# Patient Record
Sex: FEMALE | Race: WHITE | NOT HISPANIC OR LATINO | Employment: FULL TIME | URBAN - METROPOLITAN AREA
[De-identification: names, ages, dates, MRNs, and addresses within clinical notes are randomized per-mention and may not be internally consistent; named-entity substitution may affect disease eponyms.]

---

## 2017-05-24 ENCOUNTER — TELEPHONE (OUTPATIENT)
Dept: PEDIATRIC CARDIOLOGY | Facility: CLINIC | Age: 22
End: 2017-05-24

## 2017-05-24 DIAGNOSIS — Q21.11 OSTIUM SECUNDUM TYPE ATRIAL SEPTAL DEFECT: ICD-10-CM

## 2017-05-24 DIAGNOSIS — Q21.10 ASD (ATRIAL SEPTAL DEFECT): ICD-10-CM

## 2017-05-24 DIAGNOSIS — Z87.74 S/P PDA REPAIR: ICD-10-CM

## 2017-05-24 DIAGNOSIS — R01.1 CARDIAC MURMUR: ICD-10-CM

## 2017-05-24 DIAGNOSIS — Q21.0 VENTRICULAR SEPTAL DEFECT: ICD-10-CM

## 2017-05-24 DIAGNOSIS — I45.10 RIGHT BUNDLE BRANCH BLOCK: Primary | ICD-10-CM

## 2017-05-30 ENCOUNTER — CLINICAL SUPPORT (OUTPATIENT)
Dept: PEDIATRIC CARDIOLOGY | Facility: CLINIC | Age: 22
End: 2017-05-30
Payer: COMMERCIAL

## 2017-05-30 DIAGNOSIS — R01.1 CARDIAC MURMUR: ICD-10-CM

## 2017-05-30 DIAGNOSIS — Q21.0 VENTRICULAR SEPTAL DEFECT: ICD-10-CM

## 2017-05-30 DIAGNOSIS — Q21.10 ASD (ATRIAL SEPTAL DEFECT): ICD-10-CM

## 2017-05-30 DIAGNOSIS — Z87.74 S/P PDA REPAIR: ICD-10-CM

## 2017-05-30 DIAGNOSIS — I45.10 RIGHT BUNDLE BRANCH BLOCK: ICD-10-CM

## 2017-05-31 ENCOUNTER — DOCUMENTATION ONLY (OUTPATIENT)
Dept: PEDIATRIC CARDIOLOGY | Facility: CLINIC | Age: 22
End: 2017-05-31

## 2017-05-31 DIAGNOSIS — I07.1 TRICUSPID VALVE INSUFFICIENCY, UNSPECIFIED ETIOLOGY: Primary | ICD-10-CM

## 2017-05-31 DIAGNOSIS — R93.1 ABNORMAL FINDING ON ECHOCARDIOGRAM: ICD-10-CM

## 2017-05-31 NOTE — PROGRESS NOTES
"TDK reviewed echo from 5/30/2017: "Last echo in Aug 2013 and RVSP at that time was 27mmHg. Will ask at follow up about any sleep disturbance vs. Anxiety. To follow up on 6/27/2017 with limited echo same day to recheck RVSP".    TDK/AB    Called Soy and updated on above review. Will plan to repeat limited echo same day as visit. All questions answered.    AB  "

## 2017-06-27 ENCOUNTER — CLINICAL SUPPORT (OUTPATIENT)
Dept: PEDIATRIC CARDIOLOGY | Facility: CLINIC | Age: 22
End: 2017-06-27
Payer: COMMERCIAL

## 2017-06-27 ENCOUNTER — OFFICE VISIT (OUTPATIENT)
Dept: PEDIATRIC CARDIOLOGY | Facility: CLINIC | Age: 22
End: 2017-06-27
Payer: COMMERCIAL

## 2017-06-27 VITALS
DIASTOLIC BLOOD PRESSURE: 57 MMHG | HEART RATE: 50 BPM | OXYGEN SATURATION: 100 % | HEIGHT: 67 IN | BODY MASS INDEX: 17.11 KG/M2 | SYSTOLIC BLOOD PRESSURE: 102 MMHG | WEIGHT: 109 LBS | RESPIRATION RATE: 20 BRPM

## 2017-06-27 DIAGNOSIS — I45.10 RBBB: ICD-10-CM

## 2017-06-27 DIAGNOSIS — I95.9 HYPOTENSION, UNSPECIFIED HYPOTENSION TYPE: ICD-10-CM

## 2017-06-27 DIAGNOSIS — Z87.74 S/P VSD CLOSURE: Primary | ICD-10-CM

## 2017-06-27 DIAGNOSIS — R93.1 ECHOCARDIOGRAM ABNORMAL: ICD-10-CM

## 2017-06-27 DIAGNOSIS — R01.1 MURMUR: ICD-10-CM

## 2017-06-27 DIAGNOSIS — I07.1 TRICUSPID VALVE INSUFFICIENCY, UNSPECIFIED ETIOLOGY: ICD-10-CM

## 2017-06-27 DIAGNOSIS — R93.1 ABNORMAL FINDING ON ECHOCARDIOGRAM: ICD-10-CM

## 2017-06-27 DIAGNOSIS — Z87.74 S/P REPAIR OF PDA: ICD-10-CM

## 2017-06-27 DIAGNOSIS — R94.31 EKG, ABNORMAL: ICD-10-CM

## 2017-06-27 DIAGNOSIS — Z87.74 S/P PDA REPAIR: ICD-10-CM

## 2017-06-27 DIAGNOSIS — Z91.89 AT HIGH RISK FOR CARDIAC ARRHYTHMIA: ICD-10-CM

## 2017-06-27 DIAGNOSIS — Z87.74 STATUS POST PATCH CLOSURE OF ASD: ICD-10-CM

## 2017-06-27 PROCEDURE — 93000 ELECTROCARDIOGRAM COMPLETE: CPT | Mod: S$GLB,,, | Performed by: PEDIATRICS

## 2017-06-27 PROCEDURE — 99214 OFFICE O/P EST MOD 30 MIN: CPT | Mod: S$GLB,,, | Performed by: PHYSICIAN ASSISTANT

## 2017-06-27 NOTE — PATIENT INSTRUCTIONS
Stephen Yun MD  Pediatric Cardiology  [address] [place] [address]  [phone]    General Guidelines    Name: Soy Serna                   : 1995    Diagnosis:   1. S/P VSD closure    2. Status post suture closure ASD    3. S/P repair of PDA    4. RBBB    5. EKG, abnormal    6. Hypotension, unspecified hypotension type        PCP: Evaristo Gannon Jr, MD  PCP Phone Number: [phone]    · If you have an emergency or you think you have an emergency, go to the nearest emergency room!     · Breathing too fast, doesnt look right, consistently not eating well, your child needs to be checked. These are general indications that your child is not feeling well. This may be caused by anything, a stomach virus, an ear ache or heart disease, so please call Evaristo Gannon Jr, MD. If Evaristo Gannon Jr, MD thinks you need to be checked for your heart, they will let us know.     · If your child experiences a rapid or very slow heart rate and has the following symptoms, call Evaristo Gannon Jr, MD or go to the nearest emergency room.   · unexplained chest pain   · does not look right   · feels like they are going to pass out   · actually passes out for unexplained reasons   · weakness or fatigue   · shortness of breath  or breathing fast   · consistent poor feeding     · If your child experiences a rapid or very slow heart rate that lasts longer than 30 minutes call Evaristo Gannon Jr, MD or go to the nearest emergency room.     · If your child feels like they are going to pass out - have them sit down or lay down immediately. Raise the feet above the head (prop the feet on a chair or the wall) until the feeling passes. Slowly allow the child to sit, then stand. If the feeling returns, lay back down and start over.              It is very important that you notify Evaristo Gannon Jr, MD first. Evaristo Gannon Jr, MD or the ER  Physician can reach Dr. Stephen Yun at the office or through [facility] at [phone] as needed.      Call our office ([phone]) one week after for test results.

## 2017-06-27 NOTE — PROGRESS NOTES
[facility]  Soy Serna  1995    Soy Serna is a 22 y.o. female presenting for follow-up of VSD s/p patch closure, ASD s/p suture closure, PDA s/p double ligation (all 5/6/97), hypotension, complete RBBB.  Soy is here today with her {PEDS CARD, HERE TODAY WITH:21092}.    HPI  Soy Serna has been followed in clinic with a history of VSD s/p patch closure, ASD s/p suture closure, PDA s/p double ligation (all 5/6/97), hypotension, complete RBBB.     She was last seen in June 2016 and at that time she was doing well with no complaints. His exam that day revealed a grade 1/6 Systolic ejection murmur radiating to left lower sternal boarder that is loudest while supine.       Mom states Soy has been doing well since last visit. Mom states Soy has a lot of energy and does not get short of breath with activity. Mom states Soy is meeting *** milestones. she is tolerating table food without any issues. Soy take oz of Enfamil every hours without diaphoresis, fatigue, or cyanosis. Denies any recent illness, surgeries, or hospitalizations.    There {ACTIONS; ARE/NOT:50701} reports of {Symptoms; cardiac peds w/o none:46526693}. No other cardiovascular or medical concerns are reported.     Current Medications:   Previous Medications    No medications on file     Allergies: Review of patient's allergies indicates:No Known Allergies    Family History   Problem Relation Age of Onset    No Known Problems Mother     No Known Problems Father     No Known Problems Maternal Grandmother     No Known Problems Maternal Grandfather     No Known Problems Paternal Grandmother     Cardiomyopathy Neg Hx     Early death Neg Hx     Congenital heart disease Neg Hx      Past Medical History:   Diagnosis Date    ASD (atrial septal defect)     s/p suture closure    Complete right bundle branch block     Functional heart murmur     Hypotension     PDA (patent ductus arteriosus)      s/p double ligation    Tricuspid regurgitation     w/ increased RVSP     VSD (ventricular septal defect)     s/p patch closure     Social History     Social History    Marital status: Single     Spouse name: N/A    Number of children: N/A    Years of education: N/A     Social History Main Topics    Smoking status: Passive Smoke Exposure - Never Smoker    Smokeless tobacco: Not on file    Alcohol use Not on file    Drug use: Unknown    Sexual activity: Not on file     Other Topics Concern    Not on file     Social History Narrative    No narrative on file     Past Surgical History:   Procedure Laterality Date    CARDIAC SURGERY  1997    [facility]: VSD- s/p patch closure; ASD- s/p suture closure; PDA- s/p double ligation    DENTAL SURGERY         Review of Systems  GENERAL: No fever, chills, fatigability, malaise  or weight loss.  CHEST: Denies dyspnea on exertion, cyanosis, wheezing, cough, sputum production or shortness of breath.  CARDIOVASCULAR: Denies chest pain, palpitations, diaphoresis, shortness of breath, or reduced exercise tolerance.  ABDOMEN: Appetite fine. No weight loss. Denies diarrhea, abdominal pain, nausea or vomiting.  PERIPHERAL VASCULAR: No edema, varicosities, or cyanosis.  NEUROLOGIC: no dizziness, no history of syncope by report, no headache   MUSCULOSKELETAL: Denies any muscle weakness or cramping  PSYCHOLOGICAL/BAHAVIORAL: Denies any anxiety, stress, confusion  SKIN: Denies any rashes or color change  HEMATOLOGIC: Denies any abnormal bruising or bleeding, denies sickle cell trait or disease  ALLERGY/IMMUNOLOGIC: Denies any environmental allergies.       Objective:   There were no vitals taken for this visit.    Physical Exam  GENERAL: Awake, well-developed well-nourished, no apparent distress  HEENT: mucous membranes moist and pink, normocephalic, no cranial or carotid bruits, sclera anicteric  NECK: no lymphadenopathy  CHEST: Good air movement, clear to auscultation  "bilaterally  CARDIOVASCULAR: Quiet precordium, regular rate and rhythm, single S1, split S2, normal P2, No S3 or S4, no rubs or gallops. No clicks or rumbles. No cardiomegaly by palpation. /6 murmur noted at the  ABDOMEN: Soft, nontender nondistended, no hepatosplenomegaly, no aortic bruits  EXTREMITIES: Warm well perfused, 2+ radial/pedal/femoral, pulses, capillary refill 2 seconds, no clubbing, cyanosis, or edema  NEURO: Alert and oriented, cooperative with exam, face symmetric, moves all extremities well.    Tests:   Today's EKG interpretation by Dr. Yun reveals:   {[facility] EK}  (Final report in electronic medical record)    Echocardiogram:   Preliminary report from today's limited echo:    Pertinent Echocardiographic findings from the Echo dated 17 are:   S/P VSD Patch, ASD and PDA suture.  Technically difficult study due to very thin body habitus and lung interference.  There are 4 chambers with normally aligned great vessels.  Chamber sizes are qualitatively normal.  There is good LV function.  There are no shunts noted.  The right coronary artery and left coronary are patent by 2D.  No residual shunting noted.  Flattened IVS motion  Two jets of moderate tricuspid regurgitation noted.  TAPSE 1.4  MV E/A 2.7  LV Lat. E' 13.5 cm/s  RVSP 38 mm Hg**  Clinical Correlation Suggested  Follow Up Warranted  Review with chart**  (Full report in electronic medical record)      Assessment:  No diagnosis found.      Discussion/Plan:   Soy Serna is a 22 y.o. female       Follow up with the primary care provider for the following issues: Nothing identified.    Activity:{Blank single:27710::"No activity restrictions are indicated at this time. Activities may include endurance training, interscholastic athletic, competition and contact sports.","Moderate activity restrictions are recommended. Activities may include regular physical education classes, tennis and baseball.","Light exercise is recommended. " "Activities such as non-strenuous team games, recreational swimming, jogging, and cycling are suggested.","Moderate activity limitations are recommended. Activities include attending school but NO participation in physical education classes.","Extreme activity restrictions are recommended. Activities should include only homebound or wheelchair activities.","She can participate in normal age-appropriate activities. She should be allowed to set .his own pace and rest if fatigued."}    {Blank single:37088::"Selective","Complete","No"} endocarditis prophylaxis is recommended in this circumstance.     I spent over 30 minutes with the patient. Over 50% of the time was spent counseling the patient and family member    Dr. Yun reviewed history and physical exam. He then performed the physical exam. He discussed the findings with the patient's caregiver(s), and answered all questions. I have reviewed our general guidelines related to cardiac issues with the family. I instructed them in the event of an emergency to call 911 or go to the nearest emergency room. They know to contact the PCP if problems arise or if they are in doubt.    Medications:   No current outpatient prescriptions on file.     No current facility-administered medications for this visit.         Orders:   No orders of the defined types were placed in this encounter.        Follow-Up:     Return to clinic in *** with EKG or sooner if there are any concerns.       Sincerely,  Stephen Yun MD    Note Contributing Authors:  MD Dominique Randolph PA-C  06/27/2017    Attestation: Stephen Yun MD    I have reviewed the records and agree with the above. I have examined the patient and discussed the findings with the family in attendance. All questions were answered to their satisfaction. I agree with the plan and the follow up instructions.  "

## 2017-06-27 NOTE — LETTER
June 27, 2017      Evaristo Gannon Jr., MD  [address] [facility] [address]           [facility]  [address]  [facility] [address]  Phone: [phone]  Fax: [phone]          Patient: Soy Serna   MR Number: [id number]   YOB: 1995   Date of Visit: 6/27/2017       Dear Dr. Stephen Yun:    Thank you for referring Soy Serna to me for evaluation. Attached you will find relevant portions of my assessment and plan of care.    If you have questions, please do not hesitate to call me. I look forward to following Soy Serna along with you.    Sincerely,    Tamica Polo PA-C    Enclosure  CC:  No Recipients    If you would like to receive this communication electronically, please contact [email or web address] or [phone] to request more information on [facility] [employer] access.    For providers and/or their staff who would like to refer a patient to [facility], please contact us through our one-stop-shop provider referral line, [facility], at [phone].    If you feel you have received this communication in error or would no longer like to receive these types of communications, please e-mail [email or web address]

## 2017-06-27 NOTE — PROGRESS NOTES
"[facility]  Soy Serna  1995    Soy Serna is a 22 y.o. female presenting for follow-up of VSD s/p patch closure, ASD s/p suture closure, PDA s/p suture closure, PSA s/p double ligation, hypotension, complete RBBB.  Syo is here today unaccompanied but her father was on speaker phone to listen/talk to Dr. Yun.     HPI  Soy Serna is seen in clinic for follow up of VSD a/p patch closure, ASD s/p suture closure, PDA s/p suture closure, PSA s/p double ligation, hypotension, complete RBBB. Murmur was noted on first day of life and cardiac exam was obtained, revealing VSD, PDA, and ASD. Surgery was done on  5/5/97 at [place] by Dr. Cintron which included Patch closure of VSD, Suture closure of ASD, and Double ligation of PDA.  Soy was last seen 6/16/15 and there was a grade 1/6 Systolic ejection murmur radiating to left upper sternal boarder that is loudest while supine.  She was asked to return in 1 year with echo 1st available. She is late for follow up which she has been in the past as well. Echo done 5/30/17 that showed Flattened IVS motion, Two jets of moderate tricuspid regurgitation noted, TAPSE 1.4, MV E/A 2.7, LV Lat. E' 13.5 cm/s, and RVSP 38 mm Hg. TDK reviewed echo from 5/30/2017: "Last echo in Aug 2013 and RVSP at that time was 27mmHg. Will ask at follow up about any sleep disturbance vs. Anxiety. To follow up on 6/27/2017 with limited echo same day to recheck RVSP".  Soy has been doing well since last visit.  Soy has a lot of energy and does not get short of breath with activity.  Denies any recent illness, surgeries, or hospitalizations. No concerns today. No sleep disturbances. She is not snoring at this time. She is not anxious and does not thinks he was anxious at previous echo. 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  She is not taking any medications at this time per report.     There are no reports of chest pain, chest pain with exertion, cyanosis, exercise " intolerance, dyspnea, fatigue, palpitations, syncope and tachypnea. No other cardiovascular or medical concerns are reported.     Current Medications:   Previous Medications    No medications on file     Allergies: Review of patient's allergies indicates:  No Known Allergies    Family History   Problem Relation Age of Onset    No Known Problems Mother     No Known Problems Father     No Known Problems Maternal Grandmother     No Known Problems Maternal Grandfather     No Known Problems Paternal Grandmother     No Known Problems Paternal Grandfather     Cardiomyopathy Neg Hx     Early death Neg Hx     Congenital heart disease Neg Hx     Arrhythmia Neg Hx     Heart attacks under age 50 Neg Hx     Hypertension Neg Hx     Long QT syndrome Neg Hx     Pacemaker/defibrilator Neg Hx      Past Medical History:   Diagnosis Date    ASD (atrial septal defect)     s/p suture closure    Complete right bundle branch block     Functional heart murmur     Hypotension     PDA (patent ductus arteriosus)     s/p double ligation    Tricuspid regurgitation     w/ increased RVSP     VSD (ventricular septal defect)     s/p patch closure     Social History     Social History    Marital status: Single     Spouse name: N/A    Number of children: N/A    Years of education: N/A     Social History Main Topics    Smoking status: Passive Smoke Exposure - Never Smoker    Smokeless tobacco: None    Alcohol use None    Drug use: Unknown    Sexual activity: Not Asked     Other Topics Concern    None     Social History Narrative    She is at [employer] studying [employer].      Past Surgical History:   Procedure Laterality Date    CARDIAC SURGERY  1997    [facility]: VSD- s/p patch closure; ASD- s/p suture closure; PDA- s/p double ligation    DENTAL SURGERY         Past medical history, family history, surgical history, social history updated and reviewed today.     Review of Systems    GENERAL: No fever, chills, fatigability,  "malaise  or weight loss.  CHEST: Denies GARSIA, cyanosis, wheezing, cough, sputum production or SOB.  CARDIOVASCULAR: Denies chest pain, palpitations, diaphoresis, SOB, or reduced exercise tolerance.  Endocrine: Denies polyphagia, polydipsia, polyuria  Skin: Denies rashes or color change  HENT: Negative for congestion, headaches and sore throat.   ABDOMEN: Appetite fine. No weight loss. Denies diarrhea, abdominal pain, nausea or vomiting.  PERIPHERAL VASCULAR: No edema, varicosities, or cyanosis.  Musculoskeletal: Negative for muscle weakness and stiffness.  NEUROLOGIC: no dizziness, no history of syncope by report, no headache   Psychiatric/Behavioral: Negative for altered mental status. The patient is not nervous/anxious.   Allergic/Immunologic: Negative for environmental allergies.     Objective:   BP (!) 102/57 (BP Location: Right arm, Patient Position: Lying, BP Method: Automatic)   Pulse (!) 50   Resp 20   Ht 5' 7" (1.702 m)   Wt 49.4 kg (109 lb)   SpO2 100%   BMI 17.07 kg/m²     Physical Exam  GENERAL: Awake, well-developed well-nourished, no apparent distress  HEENT: mucous membranes moist and pink, normocephalic, no cranial or carotid bruits, sclera anicteric  NECK:  no lymphadenopathy  CHEST: Good air movement, clear to auscultation bilaterally  CARDIOVASCULAR: Quiet precordium, regular rate and rhythm, single S1, constantly split S2 consistent with RBBB with normal pulmonary component , normal P2, No S3 or S4, no rubs or gallops. No clicks or rumbles. No cardiomegaly by palpation. 1/6 pulmonary ejection murmur noted at the ULSB. HR 96 bpm standing (WNL)  ABDOMEN: Soft, nontender nondistended, no hepatosplenomegaly, no aortic bruits  EXTREMITIES: Warm well perfused, 2+ radial/pedal/femoral, pulses, capillary refill 2 seconds, no clubbing, cyanosis, or edema  NEURO: Alert and oriented, cooperative with exam, face symmetric, moves all extremities well.  Skin: pink, turgor WNL  Vitals reviewed     Tests: "   Today's EKG interpretation by Dr. Yun reveals:   NSR   rSr' V1 (CRBBB)  Abnormal inferior T wave (AVF)   (+) (V5-6) t-wave  Borderline low voltage (QRS)  (Final report in electronic medical record)    Preliminary report of limited echo today showed RVSP WNL with max 31. Soy will call in 1 week for results.     Echo 5/30/17  S/P VSD Patch, ASD and PDA suture.  Technically difficult study due to very thin body habitus and lung interference.  There are 4 chambers with normally aligned great vessels.  Chamber sizes are qualitatively normal.  There is good LV function.  There are no shunts noted.  The right coronary artery and left coronary are patent by 2D.  No residual shunting noted.  Flattened IVS motion  Two jets of moderate tricuspid regurgitation noted.  TAPSE 1.4  MV E/A 2.7  LV Lat. E' 13.5 cm/s  RVSP 38 mm Hg  Clinical Correlation Suggested  Follow Up Warranted  Review with chart  (Full report in electronic medical record)     Holter/Event:   Holter/Event results from 8/6/11 are:  Sinus rhythm with complete right bundle branch block and artifact is noted. Maximum heart rate was 159 ( ST, watching scary movie, poorly defined p wave), minium heart rate was 43 (SB) and average heart rate was 76 (WNL). There was no SVT, VT, VF or complete heart block noted. Rare PAC's were noted. Rare PVC's were noted.      Treadmill/Stress:   Treadmill stress test results dated 1/19/09 are:  75th percentile for age. Stress test was stopped when due to patient fatigue and end point. Maximum heart rate was 180 and maximum blood pressure was 165/84. No dysrhythmias or ischemia were noted. No chest pain was reported. Recovery was within normal limits. Baseline CRBBB is noted.     Assessment:  Patient Active Problem List   Diagnosis    S/P VSD closure    Status post suture closure ASD    S/P repair of PDA    RBBB    EKG, abnormal    Hypotension    At high risk for cardiac arrhythmia    Echocardiogram abnormal        Discussion/ Plan:   Dr. Yun reviewed history and physical exam. He then performed the physical exam. He discussed the findings with the patient's caregiver(s), and answered all questions    Dr. Yun and I have reviewed our general guidelines related to cardiac issues with the family.  I instructed them in the event of an emergency to call 911 or go to the nearest emergency room.  They know to contact the PCP if problems arise or if they are in doubt.    Soy is followed in clinic for VSD s/p patch closure, ASD s/p suture closure, PDA s/p suture closure, PSA s/p double ligation, hypotension, complete RBBB. Her last echo suggested that her RVSP was elevated. However, repeat echo today showed a normal RVSP on preliminary report. She will call in 1 week for final results. Dr. Yun states that pulmonary HTN related to her VSD that has been closed is unlikely because there is no residual shunt and was closed early in life. Dr. Yun discussed the possibility of idiopathic pulmonary HTN but is unlikely as well. No reports of fatigue, SOB, ect. Therefore, Dr. Yun doubts that her RVSP was truly elevated and would like to repeat her echo 1 month before her next visit in 1 year for monitoring. Dr. Yun discussed importance of not missing appointments and following up at appropriate intervals. Dr. Yun discussed that she is at risk of dysrhythmias because she had surgery on her heart. She is asymptomatic at this time but should alert us with any palpations, chest pain, tachycardia, syncope, ect and will consider a monitor at that time. Her EKG is abnormal but Dr. Yun believes this fits with her history. Dr. Yun would like to repeat the EKG at the next visit to monitor for changes.  Her BP is on the low side again today. She is asymptomatic but Orthostatic hypotension guidelines were reviewed and include no dark water swimming without a life vest, no clear water swimming without a life vest and/or strict   and/or adult supervision.  If syncope or presyncope is experienced, they are to resume a position of comfort, either sitting or laying down.  I also suggested they elevate their feet 6 inches above their head .  I have requested the patient increase the intake of clear fluids with electrolytes (tap water if feasible) which may help to raise the blood pressure and should help combat orthostatic hypotension.  Dr. Yun and I have discussed normal heart rate and rhythm, physiological tachycardia, and cardiac dysrhythmias. We have discussed red flags for dysrhythmias including sudden onset and sudden resolution, heart rates which wake the child up from sleep during the night, tachycardia associated with syncope or which lasts for a long time, and heart rates which are very high. If Soy Serna should have tachycardia(fast heart rate) lasting more than 20 min accompanied by symptoms (Chest pain, dizziness, shortness of breath), the parents or legal guardians should be notified. In the event that Soy has loss of consciousness or is unresponsive, you should call 911, initiate CPR and notify parents or legal guardian.    I spent over 35 minutes with the patient. Over 50% of the time was spent counseling the patient and family member VSD s/p patch closure, ASD s/p suture closure, PDA s/p suture closure, PSA s/p double ligation, hypotension, complete RBBB, elevated RVSP, ect    1. Activity:No activity restrictions are indicated at this time. Activities may include endurance training, interscholastic athletic, competition and contact sports.      2. No endocarditis prophylaxis is recommended in this circumstance.     3. Medications:   No current outpatient prescriptions on file.     No current facility-administered medications for this visit.         4. Orders placed this encounter  Orders Placed This Encounter   Procedures    EKG 12-lead    Echocardiogram pediatric         Follow-Up:     Return to clinic in 1 year  with echo 1 month echo pending her limited echo today or sooner if there are any concerns      Sincerely,  Stephen Yun MD    Note Contributing Authors:  MD Tamica Randolph PA-C  06/27/2017    Attestation: Stephen Yun MD    I have reviewed the records and agree with the above. I have examined the patient and discussed the findings with the family in attendance. All questions were answered to their satisfaction. I agree with the plan and the follow up instructions.

## 2018-05-15 ENCOUNTER — CLINICAL SUPPORT (OUTPATIENT)
Dept: PEDIATRIC CARDIOLOGY | Facility: CLINIC | Age: 23
End: 2018-05-15
Attending: PEDIATRICS
Payer: COMMERCIAL

## 2018-05-15 DIAGNOSIS — I45.10 RBBB: ICD-10-CM

## 2018-05-15 DIAGNOSIS — Z87.74 S/P REPAIR OF PDA: ICD-10-CM

## 2018-05-15 DIAGNOSIS — I95.9 HYPOTENSION, UNSPECIFIED HYPOTENSION TYPE: ICD-10-CM

## 2018-05-15 DIAGNOSIS — Z91.89 AT HIGH RISK FOR CARDIAC ARRHYTHMIA: ICD-10-CM

## 2018-05-15 DIAGNOSIS — Z87.74 S/P VSD CLOSURE: ICD-10-CM

## 2018-05-15 DIAGNOSIS — Z87.74 STATUS POST PATCH CLOSURE OF ASD: ICD-10-CM

## 2018-05-15 DIAGNOSIS — R94.31 EKG, ABNORMAL: ICD-10-CM

## 2018-05-24 ENCOUNTER — TELEPHONE (OUTPATIENT)
Dept: PEDIATRIC CARDIOLOGY | Facility: CLINIC | Age: 23
End: 2018-05-24

## 2018-05-24 NOTE — TELEPHONE ENCOUNTER
"Called Soy to update: "Please update Soy: Dr. Yun reviewed echo and stable findings from previous echo in 2017. Her RV function is mildly decreased (new) but since it is only mild, will continue to monitor. At her last visit she did not report snoring (sign of TAINA) or SOB,fatigue,ect (signs of pulmonary HTN) but needs to be monitored for changes. Will correlate findings at follow up visit in July."  Reminded of f/u on 7/26/2018. All questions answered.   "

## 2018-05-24 NOTE — TELEPHONE ENCOUNTER
Echo 5/15/18  S/P VSD, ASD, and PDA suture closures.  There are 4 chambers with normally aligned great vessels.  Chamber sizes are qualitatively normal.  There is good LV function.  The right coronary artery and left coronary are patent by 2D.  No residual shunts noted.  Moderate TR with 2 jets  Trivial MR, PI  Mildly decreased RV function  Bradycardia noted.  Flattening and paradoxical motion of the ventricular septum.  Trivial MR, PI  LV EF A4C 57%  RVSP 32, 35, 39 mmHg  TAPSE 12-14 mm  LV Lateral Tissue Doppler WNL  LA Volume 18 ml/m2  Clinical Correlation Suggested  Follow Up Warranted    Dr. Yun reviewed echo and stable findings from previous echo in May 2017.  Her RV function is mildly decreased (new) but since it is only mild, will continue to monitor. At her last visit she did not report snoring (sign of TAINA) or SOB,fatigue,ect (signs of pulmonary HTN) but needs to be monitored for changes. Will correlate findings at follow up visit in July.

## 2018-07-26 ENCOUNTER — OFFICE VISIT (OUTPATIENT)
Dept: PEDIATRIC CARDIOLOGY | Facility: CLINIC | Age: 23
End: 2018-07-26
Payer: COMMERCIAL

## 2018-07-26 ENCOUNTER — CLINICAL SUPPORT (OUTPATIENT)
Dept: PEDIATRIC CARDIOLOGY | Facility: CLINIC | Age: 23
End: 2018-07-26
Attending: NURSE PRACTITIONER
Payer: COMMERCIAL

## 2018-07-26 VITALS
WEIGHT: 107.19 LBS | DIASTOLIC BLOOD PRESSURE: 50 MMHG | OXYGEN SATURATION: 100 % | HEIGHT: 66 IN | BODY MASS INDEX: 17.23 KG/M2 | HEART RATE: 58 BPM | RESPIRATION RATE: 20 BRPM | SYSTOLIC BLOOD PRESSURE: 98 MMHG

## 2018-07-26 DIAGNOSIS — I45.10 RIGHT BUNDLE BRANCH BLOCK: ICD-10-CM

## 2018-07-26 DIAGNOSIS — Z91.89 AT HIGH RISK FOR CARDIAC ARRHYTHMIA: ICD-10-CM

## 2018-07-26 DIAGNOSIS — Z87.74 S/P VSD CLOSURE: ICD-10-CM

## 2018-07-26 DIAGNOSIS — Z87.74 STATUS POST PATCH CLOSURE OF ASD: ICD-10-CM

## 2018-07-26 DIAGNOSIS — Z87.74 S/P REPAIR OF PDA: ICD-10-CM

## 2018-07-26 PROCEDURE — 99214 OFFICE O/P EST MOD 30 MIN: CPT | Mod: S$GLB,,, | Performed by: NURSE PRACTITIONER

## 2018-07-26 PROCEDURE — 0298T HOLTER MONITOR - 3-14 DAY PEDIATRICS: CPT | Mod: S$GLB,,, | Performed by: PEDIATRICS

## 2018-07-26 PROCEDURE — 93000 ELECTROCARDIOGRAM COMPLETE: CPT | Mod: 59,S$GLB,, | Performed by: PEDIATRICS

## 2018-07-26 PROCEDURE — 3008F BODY MASS INDEX DOCD: CPT | Mod: CPTII,S$GLB,, | Performed by: NURSE PRACTITIONER

## 2018-07-26 RX ORDER — NORETHINDRONE ACETATE AND ETHINYL ESTRADIOL AND FERROUS FUMARATE 1MG-20(24)
KIT ORAL DAILY
COMMUNITY
Start: 2018-07-01 | End: 2022-11-22 | Stop reason: ALTCHOICE

## 2018-07-26 NOTE — PROGRESS NOTES
[facility]  Soy Serna  1995    Soy Serna is a 23 y.o. female presenting for follow-up of s/p VSD, PDA, ASD.  Soy is here unaccompanied today but father was on speaker phone throughout the visit.    CARMELO Sainz was diagnosed with CHD at birth, including VSD, PDA, and ASD. She was submitted for surgical repair at 2 months of age ([birth year], [facility] [place]) including patch closure of VSD, suture closure of ASD, double ligation of PDA and has done very well. Soy was last seen here in June 2017 and was doing well with no cardiac concerns voiced. Exam that day revealed grade 1/6 PEM noted at ULSB, HR 96bpm with standing, constantly split S2, CRBBB on EKG. She was asked to return in 1 year with echo just prior to return. She comes today as requested. Since the last visit, Soy has done well overall with no major illnesses or hospitalizations. She denies any concerns or symptoms.       Current Medications:   Previous Medications    NORETHINDRONE-E.ESTRADIOL-IRON 1 MG-20 MCG(24) /75 MG (4) CHEW    once daily.     Allergies: Review of patient's allergies indicates:  No Known Allergies    Family History   Problem Relation Age of Onset    No Known Problems Mother     No Known Problems Father     No Known Problems Maternal Grandmother     No Known Problems Maternal Grandfather     No Known Problems Paternal Grandmother     No Known Problems Paternal Grandfather     Cardiomyopathy Neg Hx     Early death Neg Hx     Congenital heart disease Neg Hx     Arrhythmia Neg Hx     Heart attacks under age 50 Neg Hx     Hypertension Neg Hx     Long QT syndrome Neg Hx     Pacemaker/defibrilator Neg Hx      Past Medical History:   Diagnosis Date    Abnormal EKG     ASD (atrial septal defect)     s/p suture closure    Complete right bundle branch block     Functional heart murmur     Hypotension     PDA (patent ductus arteriosus)     s/p double ligation    Tricuspid regurgitation      "w/ increased RVSP     VSD (ventricular septal defect)     s/p patch closure     Social History     Social History    Marital status: Single     Spouse name: N/A    Number of children: N/A    Years of education: N/A     Social History Main Topics    Smoking status: Passive Smoke Exposure - Never Smoker    Smokeless tobacco: None    Alcohol use None    Drug use: Unknown    Sexual activity: Not Asked     Other Topics Concern    None     Social History Narrative    She is at [employer] studying [employer]. No exercise or physical activity regularly.     Caffeine: mostly water but occasionally coke when out to eat.     Past Surgical History:   Procedure Laterality Date    CARDIAC SURGERY  1997    [facility]: VSD- s/p patch closure; ASD- s/p suture closure; PDA- s/p double ligation    DENTAL SURGERY       Birth History    Birth     Weight: 2.92 kg (6 lb 7 oz)    Gestation Age: 40 wks       Review of Systems   Constitutional: Negative for activity change, appetite change and fatigue.   Respiratory: Negative for shortness of breath, wheezing and stridor.         No snoring   Cardiovascular: Negative for chest pain and palpitations.   Gastrointestinal: Negative.    Genitourinary: Negative.    Musculoskeletal: Negative.    Skin: Negative for color change and rash.   Neurological: Negative for dizziness, seizures, syncope, weakness and headaches.   Hematological: Does not bruise/bleed easily.       Objective:   Vitals:    07/26/18 0948   BP: (!) 98/50   BP Location: Right arm   Patient Position: Lying   BP Method: Medium (Automatic)   Pulse: (!) 58   Resp: 20   SpO2: 100%   Weight: 48.6 kg (107 lb 3 oz)   Height: 5' 6.22" (1.682 m)       Physical Exam   Constitutional: She is oriented to person, place, and time. Vital signs are normal. She appears well-developed and well-nourished. She is active and cooperative. No distress.   HENT:   Head: Normocephalic.   Neck: Normal range of motion.   Cardiovascular: Normal rate, " regular rhythm and S1 normal.   No extrasystoles are present. Exam reveals no S3 and no S4.    Murmur [facility] 1[facility]6 [facility] heard.  Pulses:       Radial pulses are 2+ on the right side.        Femoral pulses are 2+ on the right side.  There are no clicks, rumbles, rubs, lifts, taps, or thrills noted. Constantly split S2.    Pulmonary/Chest: Effort normal and breath sounds normal. No respiratory distress. She exhibits no deformity.   There is a well healed sternotomy   Abdominal: Soft. Normal appearance and bowel sounds are normal. She exhibits no distension. There is no hepatosplenomegaly.   There are no abdominal bruits noted.   Musculoskeletal: Normal range of motion.   Neurological: She is alert and oriented to person, place, and time.   Skin: Skin is warm and dry. No rash noted. No cyanosis. Nails show no clubbing.   Psychiatric: She has a normal mood and affect. Her speech is normal and behavior is normal.   Nursing note and vitals reviewed.      Tests:   Today's EKG interpretation by Dr. Yun reveals: normal sinus rhythm with QRS axis +55 degrees in the frontal plane. There is no atrial enlargement noted. CRBBB noted; no change.   (Final report in electronic medical record)    Echocardiogram:   Pertinent Echocardiographic findings from the Echo dated 5/15/18 are:   Moderate TR with 2 jets  Mildly decreased RV function  Bradycardia noted  Flattening and paradoxical motion of the ventricular septum  LVEF A4C 57%  RVSP 32, 35, 39mmHg  No residual shunts noted.   (Full report in electronic medical record)      Assessment:  1. S/P VSD closure    2. Status post suture closure ASD    3. S/P repair of PDA    4. Right bundle branch block    5. At high risk for cardiac arrhythmia        Discussion:   Dr. Yun reviewed history and physical exam. He then performed the physical exam. He discussed the findings with the patient's caregiver(s), and answered all questions.    Soy's examination  today is stable and consistent with her history of cardiac surgery.  She is asymptomatic with her heart rate and blood pressure being on the low side.  We will obtain a 3 day Holter monitor due to her risk of dysrhythmias secondary to her cardiac surgery.  We will plan to repeat an echo next year, being sure to get FAC measurements for right ventricular function.    I have reviewed our general guidelines related to cardiac issues with the family.  I instructed them in the event of an emergency to call 911 or go to the nearest emergency room.  They know to contact the PCP if problems arise or if they are in doubt.      Plan:    1. Activity:No activity restrictions are indicated at this time. Activities may include endurance training, interscholastic athletic, competition and contact sports.    2. No endocarditis prophylaxis is recommended in this circumstance.     3. Medications:   Current Outpatient Prescriptions   Medication Sig    norethindrone-e.estradiol-iron 1 mg-20 mcg(24) /75 mg (4) Chew once daily.     No current facility-administered medications for this visit.      4. Orders placed this encounter  Orders Placed This Encounter   Procedures    Holter Monitor - 3-14 Day Pediatrics    EKG 12-lead pediatric    Echocardiogram pediatric     5. Follow up with the primary care provider for the following issues: Nothing identified.      Follow-Up:   Follow-up for 3 day holter today, echo in 11 mo, clinic f/u and EKG in 1 year.      Sincerely,    Stephen Yun MD    Note Contributing Authors:  MD Tess Randolph APRN, PNP-C

## 2018-07-26 NOTE — PATIENT INSTRUCTIONS
Stephen Yun MD  Pediatric Cardiology  [address] [place] [address]  [phone]    General Guidelines    Name: Soy Serna                   : 1995    Diagnosis:   1. S/P VSD closure    2. Status post suture closure ASD    3. S/P repair of PDA    4. Right bundle branch block    5. At high risk for cardiac arrhythmia        PCP: Evaristo Gannon Jr, MD  PCP Phone Number: [phone]    · If you have an emergency or you think you have an emergency, go to the nearest emergency room!     · Breathing too fast, doesnt look right, consistently not eating well, your child needs to be checked. These are general indications that your child is not feeling well. This may be caused by anything, a stomach virus, an ear ache or heart disease, so please call Evaristo Gannon Jr, MD. If Evaristo Gannon Jr, MD thinks you need to be checked for your heart, they will let us know.     · If your child experiences a rapid or very slow heart rate and has the following symptoms, call Evaristo Gannon Jr, MD or go to the nearest emergency room.   · unexplained chest pain   · does not look right   · feels like they are going to pass out   · actually passes out for unexplained reasons   · weakness or fatigue   · shortness of breath  or breathing fast   · consistent poor feeding     · If your child experiences a rapid or very slow heart rate that lasts longer than 30 minutes call Evaristo Gannon Jr, MD or go to the nearest emergency room.     · If your child feels like they are going to pass out - have them sit down or lay down immediately. Raise the feet above the head (prop the feet on a chair or the wall) until the feeling passes. Slowly allow the child to sit, then stand. If the feeling returns, lay back down and start over.     It is very important that you notify Evaristo Gannon Jr, MD first. Evaristo Gannon Jr, MD or the ER Physician can  reach Dr. Stephen Yun at the office or through [facility] at [phone] as needed.    Call our office ([phone]) one week after ALL tests for results.

## 2018-07-26 NOTE — LETTER
July 26, 2018      Evaristo Gannon Jr., MD  [address]  [place] [place] [address]           [facility]  [address]  [place] [place] [address]  Phone: [phone]  Fax: [phone]          Patient: Soy Serna   MR Number: [id number]   YOB: 1995   Date of Visit: 7/26/2018       Dear Dr. Evaristo Gannon Jr.:    Thank you for referring Soy Serna to me for evaluation. Attached you will find relevant portions of my assessment and plan of care.    If you have questions, please do not hesitate to call me. I look forward to following Soy Serna along with you.    Sincerely,    MARY Zaidi,PNP-C    Enclosure  CC:  No Recipients    If you would like to receive this communication electronically, please contact [email or web address] or [phone] to request more information on [employer] access.    For providers and/or their staff who would like to refer a patient to [facility], please contact us through our one-stop-shop provider referral line, [facility], at [phone].    If you feel you have received this communication in error or would no longer like to receive these types of communications, please e-mail [email or web address]

## 2018-08-30 ENCOUNTER — TELEPHONE (OUTPATIENT)
Dept: PEDIATRIC CARDIOLOGY | Facility: CLINIC | Age: 23
End: 2018-08-30

## 2018-08-30 NOTE — TELEPHONE ENCOUNTER
Cielo phoned. Dr. Yun spoke with cielo and advised holter showed occasional bradycardia with junctional escape beats. Dr. Yun advised cielo Olivier needs stress test for further evaluation. Cielo advised he would get with Soy and call back and schedule.

## 2018-08-30 NOTE — TELEPHONE ENCOUNTER
Called father to discuss holter results - occasional bradycardia with junctional escape beats. She'll need a stress test. LM for father to return call.

## 2018-09-19 ENCOUNTER — CLINICAL SUPPORT (OUTPATIENT)
Dept: PEDIATRIC CARDIOLOGY | Facility: CLINIC | Age: 23
End: 2018-09-19
Payer: COMMERCIAL

## 2018-09-19 DIAGNOSIS — R94.31 ABNORMAL HOLTER MONITOR FINDING: ICD-10-CM

## 2018-09-19 DIAGNOSIS — Z87.74 S/P VSD CLOSURE: ICD-10-CM

## 2018-09-19 DIAGNOSIS — Z87.74 S/P VSD CLOSURE: Primary | ICD-10-CM

## 2022-01-31 ENCOUNTER — TELEPHONE (OUTPATIENT)
Dept: GENETICS | Facility: CLINIC | Age: 27
End: 2022-01-31
Payer: COMMERCIAL

## 2022-01-31 NOTE — TELEPHONE ENCOUNTER
----- Message from Tess Watson MS sent at 1/31/2022  3:02 PM CST -----  Contact: [phone]  Hey it's GC only.     ----- Message -----  From: Marian Hernandez MA  Sent: 1/31/2022   2:55 PM CST  To: Tess Watson MS    Does this pt need to be seen by GC or provider? Please advise  ----- Message -----  From: Sabina Mae  Sent: 1/31/2022   2:46 PM CST  To: [facility] Genetics Clinical Support Staff    Pt is calling to get an appt for genetic testing, her family has a history of leigh ann's disease.    Pt would like a call back.    [phone]        Pt would also like to know if she would need a referral from her PCP.

## 2022-01-31 NOTE — TELEPHONE ENCOUNTER
Spoke with pt in reference to scheduling a Genetics appointment for family history of leigh ann's disease on 2/14/22 at 9 am with Tess Watson. Pt verbalized understanding.

## 2022-02-01 ENCOUNTER — TELEPHONE (OUTPATIENT)
Dept: NEUROLOGY | Facility: CLINIC | Age: 27
End: 2022-02-01
Payer: COMMERCIAL

## 2022-02-01 ENCOUNTER — TELEPHONE (OUTPATIENT)
Dept: GENETICS | Facility: CLINIC | Age: 27
End: 2022-02-01
Payer: COMMERCIAL

## 2022-02-01 NOTE — TELEPHONE ENCOUNTER
----- Message from Makayla Mckay sent at 2/1/2022  3:50 PM CST -----  Regarding: appt  Contact: Izzy (mother) @ [phone]  Caller (Izzy [place]) patient of Dr. Delgado, asking to have patient scheduled an appt with Dr. Delgado. Caller says patient needs to be tested for [place]'s disease. Please call.

## 2022-02-01 NOTE — TELEPHONE ENCOUNTER
Spoke with pt in reference to her having questions if she was also seeing a MD. I informed her that she was seeing the GC and if GC needed to consult with the MD, she will talk with them at the time of visit. Pt verbalized understanding.

## 2022-02-03 ENCOUNTER — TELEPHONE (OUTPATIENT)
Dept: NEUROLOGY | Facility: CLINIC | Age: 27
End: 2022-02-03
Payer: COMMERCIAL

## 2022-02-03 NOTE — TELEPHONE ENCOUNTER
SW spoke to pt's mother (who made the initial request for HD testing), who reported that she wants Dr. Delgado to be at genetic testing appt. SW explained that the HD clinic has a particular clinic/day/protocol around testing for HD and suggested that she sched with the HD at-risk clinic instead. Mother asked that I speak to pt about this.

## 2022-02-11 ENCOUNTER — TELEPHONE (OUTPATIENT)
Dept: GENETICS | Facility: CLINIC | Age: 27
End: 2022-02-11
Payer: COMMERCIAL

## 2022-02-14 ENCOUNTER — OFFICE VISIT (OUTPATIENT)
Dept: GENETICS | Facility: CLINIC | Age: 27
End: 2022-02-14
Payer: COMMERCIAL

## 2022-02-14 ENCOUNTER — LAB VISIT (OUTPATIENT)
Dept: LAB | Facility: HOSPITAL | Age: 27
End: 2022-02-14
Attending: MEDICAL GENETICS
Payer: COMMERCIAL

## 2022-02-14 VITALS — BODY MASS INDEX: 18.71 KG/M2 | HEART RATE: 89 BPM | HEIGHT: 67 IN | WEIGHT: 119.19 LBS | RESPIRATION RATE: 14 BRPM

## 2022-02-14 DIAGNOSIS — Z82.0 FAMILY HISTORY OF HUNTINGTON'S DISEASE: ICD-10-CM

## 2022-02-14 DIAGNOSIS — Z82.0 FAMILY HISTORY OF HUNTINGTON'S DISEASE: Primary | ICD-10-CM

## 2022-02-14 PROCEDURE — 99499 NO LOS: ICD-10-PCS | Mod: S$GLB,,, | Performed by: MEDICAL GENETICS

## 2022-02-14 PROCEDURE — 99999 PR PBB SHADOW E&M-EST. PATIENT-LVL III: CPT | Mod: PBBFAC,,,

## 2022-02-14 PROCEDURE — 99999 PR PBB SHADOW E&M-EST. PATIENT-LVL III: ICD-10-PCS | Mod: PBBFAC,,,

## 2022-02-14 PROCEDURE — 36415 COLL VENOUS BLD VENIPUNCTURE: CPT | Performed by: MEDICAL GENETICS

## 2022-02-14 PROCEDURE — 81271 HTT GENE DETC ABNOR ALLELES: CPT | Performed by: MEDICAL GENETICS

## 2022-02-14 PROCEDURE — 96040 PR GENETIC COUNSELING, EACH 30 MIN: ICD-10-PCS | Mod: S$GLB,,, | Performed by: MEDICAL GENETICS

## 2022-02-14 PROCEDURE — 96040 PR GENETIC COUNSELING, EACH 30 MIN: CPT | Mod: S$GLB,,, | Performed by: MEDICAL GENETICS

## 2022-02-14 PROCEDURE — 99499 UNLISTED E&M SERVICE: CPT | Mod: S$GLB,,, | Performed by: MEDICAL GENETICS

## 2022-02-14 NOTE — PROGRESS NOTES
Soy Serna   DOS: 2022  : 1995   MRN: [id number]    REFERRING MD: Self-referral     REASON FOR CONSULT: Our Medical Genetic Service was asked to provide genetic counseling for this 26-year-old female with a family history of Brennon disease (HD). She presents with her  for todays visit.     PRESENT ILLNESS: Ms. Serna is a 26-year-old female with no symptoms of HD but a family history of HD in her mother, maternal aunt, and maternal grandfather. The mother is a patient of Dr. Carpenter in the HD Clinic and has 45 CAG repeats.     PAST MEDICAL HISTORY:   S/P VSD closure  Status post suture closure ASD  S/P repair of PDA  Right bundle branch block  EKG, abnormal  Hypotension  At high risk for cardiac arrhythmia  Echocardiogram abnormal    FAMILY HISTORY: Ms. Serna does not have any children. She does not have any siblings. Her mother is 50 and started to display symptoms of HD about 4 years ago. She has 45 CAG repeats. Her maternal aunt had symptoms in her 30s. Her maternal grandfather started to have symptoms in his late 30s. She has another aunt who is 52 and is asymptomatic. She has not had genetic testing.         IMPRESSION: Ms. Serna is a 26-year-old female with a family history of Brennon disease (HD).     We discussed the option of HD genetic testing. HD is a neurodegenerative autosomal dominant disorder that causes progressive motor, cognitive, and psychiatric symptoms. It is caused by >36 CAG trinucleotide repeats in the HTT gene that encodes for the huntingtin protein. The size of the expansion can correlate with age of onset. CAG repeats that are 26 or less are within normal range. CAG repeats within 27-35 are within intermediate range. Individuals with CAG repeats between 36 and 39 are likely to develop HD but may have a later age of onset. Individuals with 40 or more repeats will develop HD. Juvenile onset is associated with repeats greater than 60. The average age of  onset for HD is between ages 35 to 44 years of age with median survival time being 15 to 18 years after onset.     Anticipation is a phenomenon that can occur in families with HD, in which severity increases and age of onset decreases in subsequent generations. This is more common when the pathogenic allele is inherited paternally.     We discussed Ms. Lees motivations for testing and how testing may change her life course. She has been thinking about genetic testing for HD for many years and has familial support. Her primary motivation for testing is for preconception and family planning purposes. We discussed that preimplantation genetic diagnosis is possible even without revealing her status. She is aware of this. Reproductive options would include preimplantation genetic diagnosis, egg donation, adoption, or testing a pregnancy after conception. We do not typically test minors for HD.     I provided genetic counseling around the HD testing process, provided psychosocial support and assessment, and discussed the possible implications involved in testing, including benefits and limitations of the Genetic Information Nondiscrimination Act (ALEXANDRO). Ms. Serna understood and seemed confident in her decision to go forward with testing. We made a follow-up for March 21st for in-person results disclosure because they will be in the area at that time.     RECOMMENDATIONS:  1. HD testing today (to [facility] with 30-day release)  2. Follow-up for in-person results disclosure scheduled for 3/21  3. Follow-up with HD clinic if positive     REFERENCES: Karen PUENTES, Christiano VIEIRA, Jackson MR. Brennon Disease. 1996 Oct [birth year] [Updated 2020 Jun 11]. In: Rad RUIZ, Hardeep HH, Zulma RA, et al., editors. [employer]® [Internet]. [place]): [facility], [place]; 6648-8775. Available from: https://www.ncbi.nlm.nih.gov/books/CLY0887/    TIME SPENT: 30 minutes     Tess Watson, MPH, MS, [facility]  Certified Genetic  Counselor  [facility]     Robby Means M.D.                                                                            Section Head - Medical Genetics                                                                                       [facility]

## 2022-02-23 LAB
CLINICAL BIOCHEMIST REVIEW: NORMAL
HD REASON FOR REFERRAL: NORMAL
HD RELEASED BY: NORMAL
HD RESULT SUMMARY: NEGATIVE
HD RESULT: NORMAL
HD SPECIMEN: NORMAL
SPECIMEN SOURCE: NORMAL

## 2022-03-18 ENCOUNTER — TELEPHONE (OUTPATIENT)
Dept: GENETICS | Facility: CLINIC | Age: 27
End: 2022-03-18
Payer: COMMERCIAL

## 2022-03-21 ENCOUNTER — OFFICE VISIT (OUTPATIENT)
Dept: GENETICS | Facility: CLINIC | Age: 27
End: 2022-03-21
Payer: COMMERCIAL

## 2022-03-21 VITALS — HEIGHT: 67 IN | WEIGHT: 117.94 LBS | RESPIRATION RATE: 14 BRPM | HEART RATE: 86 BPM | BODY MASS INDEX: 18.51 KG/M2

## 2022-03-21 DIAGNOSIS — Z82.0 FAMILY HISTORY OF HUNTINGTON'S DISEASE: Primary | ICD-10-CM

## 2022-03-21 PROCEDURE — 99499 NO LOS: ICD-10-PCS | Mod: S$GLB,,, | Performed by: MEDICAL GENETICS

## 2022-03-21 PROCEDURE — 99999 PR PBB SHADOW E&M-EST. PATIENT-LVL III: CPT | Mod: PBBFAC,,,

## 2022-03-21 PROCEDURE — 99999 PR PBB SHADOW E&M-EST. PATIENT-LVL III: ICD-10-PCS | Mod: PBBFAC,,,

## 2022-03-21 PROCEDURE — 99499 UNLISTED E&M SERVICE: CPT | Mod: S$GLB,,, | Performed by: MEDICAL GENETICS

## 2022-03-21 NOTE — PROGRESS NOTES
Office Visit - Genetic Counseling Evaluation   Soy Serna  : 1995  MRN: [id number]    DATE OF SERVICE: 3/21/22  TIME SPENT: 6min    REFERRING PROVIDER: No ref. provider found    REASON FOR CONSULT:  Mrs. Serna and her  return to clinic today for [place] disease results disclosure.    HISTORY OF PRESENTING ILLNESS: Mrs. Serna was seen by my colleague, Tess Watson, for pre-test counseling regarding [place] disease (HD). She is asymptomatic. Her family history is significant for HD in her symptomatic mother with a 45 CAG repeat allele.       MEDICAL HISTORY:    Patient Active Problem List   Diagnosis    S/P VSD closure    Status post suture closure ASD    S/P repair of PDA    Right bundle branch block    EKG, abnormal    Hypotension    At high risk for cardiac arrhythmia    Echocardiogram abnormal       DISCUSSION & IMPRESSION:  Mrs. Daphne Olivier is an asymptomatic 27 y.o. female with a family history of [place] disease (HD).      Mrs. Serna does not have [place] disease. HD is caused by a CAG trinucleotide repeat in the HTT gene. CAG repeats that are 26 or less are within the normal range. When the repeat is 40 or over, that individual will develop HD. Both of Mrs. Serna's HTT CAG repeats are in the normal range: 15 and 20 CAG repeats. We reviewed that since she tested negative, her future children are not at risk for HD from her side.      Results:         RECOMMENDATIONS:  1. Follow-up jose Bailey MS, [facility]  Licensed, Certified Genetic Counselor  [facility]    Babs Huerta MD  Medical Geneticist   [facility]

## 2022-08-18 DIAGNOSIS — I45.10 COMPLETE RIGHT BUNDLE BRANCH BLOCK (RBBB): Primary | ICD-10-CM

## 2022-08-24 ENCOUNTER — CLINICAL SUPPORT (OUTPATIENT)
Dept: PEDIATRIC CARDIOLOGY | Facility: CLINIC | Age: 27
End: 2022-08-24
Attending: NURSE PRACTITIONER
Payer: COMMERCIAL

## 2022-08-24 ENCOUNTER — OFFICE VISIT (OUTPATIENT)
Dept: PEDIATRIC CARDIOLOGY | Facility: CLINIC | Age: 27
End: 2022-08-24
Payer: COMMERCIAL

## 2022-08-24 VITALS
OXYGEN SATURATION: 65 % | HEART RATE: 65 BPM | SYSTOLIC BLOOD PRESSURE: 122 MMHG | DIASTOLIC BLOOD PRESSURE: 60 MMHG | RESPIRATION RATE: 20 BRPM | BODY MASS INDEX: 19.21 KG/M2 | WEIGHT: 122.38 LBS | HEIGHT: 67 IN

## 2022-08-24 DIAGNOSIS — Z87.74 STATUS POST PATCH CLOSURE OF ASD: ICD-10-CM

## 2022-08-24 DIAGNOSIS — Z87.74 S/P REPAIR OF PDA: ICD-10-CM

## 2022-08-24 DIAGNOSIS — Z87.74 S/P VSD CLOSURE: ICD-10-CM

## 2022-08-24 DIAGNOSIS — Z3A.12 12 WEEKS GESTATION OF PREGNANCY: ICD-10-CM

## 2022-08-24 DIAGNOSIS — Z91.89 AT HIGH RISK FOR CARDIAC ARRHYTHMIA: ICD-10-CM

## 2022-08-24 DIAGNOSIS — I45.10 COMPLETE RIGHT BUNDLE BRANCH BLOCK (RBBB): ICD-10-CM

## 2022-08-24 PROCEDURE — 1160F PR REVIEW ALL MEDS BY PRESCRIBER/CLIN PHARMACIST DOCUMENTED: ICD-10-PCS | Mod: CPTII,S$GLB,, | Performed by: NURSE PRACTITIONER

## 2022-08-24 PROCEDURE — 93244 CV 3-14 DAY PEDIATRIC HOLTER MONITOR (CUPID ONLY): ICD-10-PCS | Mod: ,,, | Performed by: PEDIATRICS

## 2022-08-24 PROCEDURE — 93242 EXT ECG>48HR<7D RECORDING: CPT | Mod: ,,, | Performed by: PEDIATRICS

## 2022-08-24 PROCEDURE — 99204 PR OFFICE/OUTPT VISIT, NEW, LEVL IV, 45-59 MIN: ICD-10-PCS | Mod: 25,S$GLB,, | Performed by: NURSE PRACTITIONER

## 2022-08-24 PROCEDURE — 99204 OFFICE O/P NEW MOD 45 MIN: CPT | Mod: 25,S$GLB,, | Performed by: NURSE PRACTITIONER

## 2022-08-24 PROCEDURE — 3008F PR BODY MASS INDEX (BMI) DOCUMENTED: ICD-10-PCS | Mod: CPTII,S$GLB,, | Performed by: NURSE PRACTITIONER

## 2022-08-24 PROCEDURE — 3074F PR MOST RECENT SYSTOLIC BLOOD PRESSURE < 130 MM HG: ICD-10-PCS | Mod: CPTII,S$GLB,, | Performed by: NURSE PRACTITIONER

## 2022-08-24 PROCEDURE — 1159F MED LIST DOCD IN RCRD: CPT | Mod: CPTII,S$GLB,, | Performed by: NURSE PRACTITIONER

## 2022-08-24 PROCEDURE — 3008F BODY MASS INDEX DOCD: CPT | Mod: CPTII,S$GLB,, | Performed by: NURSE PRACTITIONER

## 2022-08-24 PROCEDURE — 3074F SYST BP LT 130 MM HG: CPT | Mod: CPTII,S$GLB,, | Performed by: NURSE PRACTITIONER

## 2022-08-24 PROCEDURE — 93244 EXT ECG>48HR<7D REV&INTERPJ: CPT | Mod: ,,, | Performed by: PEDIATRICS

## 2022-08-24 PROCEDURE — 93242 CV 3-14 DAY PEDIATRIC HOLTER MONITOR (CUPID ONLY): ICD-10-PCS | Mod: ,,, | Performed by: PEDIATRICS

## 2022-08-24 PROCEDURE — 93000 ELECTROCARDIOGRAM COMPLETE: CPT | Mod: S$GLB,,, | Performed by: PEDIATRICS

## 2022-08-24 PROCEDURE — 93000 EKG 12-LEAD: ICD-10-PCS | Mod: S$GLB,,, | Performed by: PEDIATRICS

## 2022-08-24 PROCEDURE — 3078F PR MOST RECENT DIASTOLIC BLOOD PRESSURE < 80 MM HG: ICD-10-PCS | Mod: CPTII,S$GLB,, | Performed by: NURSE PRACTITIONER

## 2022-08-24 PROCEDURE — 1159F PR MEDICATION LIST DOCUMENTED IN MEDICAL RECORD: ICD-10-PCS | Mod: CPTII,S$GLB,, | Performed by: NURSE PRACTITIONER

## 2022-08-24 PROCEDURE — 3078F DIAST BP <80 MM HG: CPT | Mod: CPTII,S$GLB,, | Performed by: NURSE PRACTITIONER

## 2022-08-24 PROCEDURE — 1160F RVW MEDS BY RX/DR IN RCRD: CPT | Mod: CPTII,S$GLB,, | Performed by: NURSE PRACTITIONER

## 2022-08-24 NOTE — PROGRESS NOTES
[facility]  Soy Davis  1995    Soy Davis is a 27 y.o. female presenting for follow-up of s/p VSD closure, ASD closure, PDA repair, RBBB, and risk for arrhythmia.  Soy is here today unaccompanied.    HPI  Soy Davis was CHD at birth, including VSD, PDA, and ASD. She was submitted for surgical repair at 2 months of age ([birth year], [facility]) including patch closure of VSD, suture closure of ASD, double ligation of PDA and has done very well.    She was last seen in July of 2018 and at that time was doing well with no complaints. Her exam that day revealed a grade 1/6 systolic murmur at the LLSB. HR and b/p were on the low side. 3 day Holter was placed and she was asked to f/u in one year with echo before the visit.     The Holter revealed some bradycardia with junctional escape beats.  She had stress test in September 2018 that was normal without escape beats.    Soy has been doing well since last visit. Soy has a lot of energy and does not get short of breath with activity. She is now 3 months pregnant. Denies any recent illness, surgeries, or hospitalizations.    There are no reports of chest pain, chest pain with exertion, cyanosis, exercise intolerance, dyspnea, fatigue, palpitations, syncope and tachypnea. No other cardiovascular or medical concerns are reported.     Current Medications:   Current Outpatient Medications on File Prior to Visit   Medication Sig Dispense Refill    norethindrone-e.estradiol-iron 1 mg-20 mcg(24) /75 mg (4) Chew once daily.       No current facility-administered medications on file prior to visit.     Allergies: Review of patient's allergies indicates:  No Known Allergies      Family History   Problem Relation Age of Onset    No Known Problems Mother     No Known Problems Father     No Known Problems Maternal Grandmother     No Known Problems Maternal Grandfather     No Known Problems Paternal Grandmother     No Known Problems  "Paternal Grandfather     Cardiomyopathy Neg Hx     Early death Neg Hx     Congenital heart disease Neg Hx     Arrhythmia Neg Hx     Heart attacks under age 50 Neg Hx     Hypertension Neg Hx     Long QT syndrome Neg Hx     Pacemaker/defibrilator Neg Hx      Past Medical History:   Diagnosis Date    ASD (atrial septal defect)     s/p suture closure    Complete right bundle branch block     PDA (patent ductus arteriosus)     s/p double ligation    Pregnancy, not yet delivered     VSD (ventricular septal defect)     s/p patch closure     Social History     Socioeconomic History    Marital status: Single   Tobacco Use    Smoking status: Never Smoker    Smokeless tobacco: Never Used   Social History Narrative    Works at [employer].      Past Surgical History:   Procedure Laterality Date    CARDIAC SURGERY  1997    [facility]: VSD- s/p patch closure; ASD- s/p suture closure; PDA- s/p double ligation    DENTAL SURGERY         Review of Systems    GENERAL: No fever, chills, fatigability, malaise  or weight loss. Pregnant  CHEST: Denies dyspnea on exertion, cyanosis, wheezing, cough, sputum production   CARDIOVASCULAR: Denies chest pain, palpitations, diaphoresis,  or reduced exercise tolerance.  ABDOMEN: Appetite normal. Denies diarrhea, abdominal pain, nausea or vomiting.  PERIPHERAL VASCULAR: No edema or cyanosis.  NEUROLOGIC: no dizziness, no syncope , no headache   MUSCULOSKELETAL: Denies muscle weakness, joint pain  PSYCHOLOGICAL/BEHAVIORAL: Denies anxiety, severe stress, confusion  SKIN: no rashes, lesions  HEMATOLOGIC: Denies any abnormal bruising or bleeding  ALLERGY/IMMUNOLOGIC: Denies any environmental allergies.     Objective:   /60 (BP Location: Right arm, Patient Position: Sitting, BP Method: Medium (Manual))   Pulse 65   Resp 20   Ht 5' 6.54" (1.69 m)   Wt 55.5 kg (122 lb 5.7 oz)   SpO2 (!) 65%   BMI 19.43 kg/m²     Physical Exam  GENERAL: Awake, well-developed well-nourished, " no apparent distress  HEENT: mucous membranes moist and pink, normocephalic, no cranial or carotid bruits, sclera anicteric  CHEST: Good air movement, clear to auscultation bilaterally. Well healed sternotomy  CARDIOVASCULAR: Quiet precordium, regular rhythm, single S1, split S2, normal P2, No S3 or S4, no rubs or gallops. No clicks or rumbles. No cardiomegaly by palpation. 1/6 PEM noted at the ULSB, 1/6 systolic murmur LLSB. ? MVP LLD position.   ABDOMEN: Soft, nontender nondistended, no hepatosplenomegaly, no aortic bruits  EXTREMITIES: Warm well perfused, 2+ brachial/femoral pulses, capillary refill <3 seconds, no clubbing, cyanosis, or edema  NEURO: Alert and oriented, cooperative with exam, face symmetric, moves all extremities well.    Tests:   Today's EKG interpretation by Dr. Yun reveals:   Sinus Rhythm and There is an rSr' pattern in V1   No LVH  RBBB  unchanged  (Final report in electronic medical record)    Echocardiogram:   Pertinent findings from the Echo dated 5/15/18 are:   S/P VSD, ASD, and PDA suture closures.   There are 4 chambers with normally aligned great vessels.   Chamber sizes are qualitatively normal.   There is good LV function.   The right coronary artery and left coronary are patent by 2D.   No residual shunts noted.   Moderate TR with 2 jets   Trivial MR, PI   Mildly decreased RV function   Bradycardia noted.   Flattening and paradoxical motion of the ventricular septum.   Trivial MR, PI   LV EF A4C 57%   RVSP 32, 35, 39 mmHg   TAPSE 12-14 mm   LV Lateral Tissue Doppler WNL   LA Volume 18 ml/m2   Clinical Correlation   Suggested Follow Up Warranted   (Full report in electronic medical record)    Holter/Event results from 7/26/2018 are:  Date of Procedure: 07/26/2018   PRE-TEST DATA   The diary was returned, but not completed.   TEST DESCRIPTION   PREDOMINANT RHYTHM   1. Sinus rhythm with heart rates varying between 44 and 158 bpm with an average of 76 bpm.   2. Occasional sinus  bradycardia with junctional escape beats   VENTRICULAR ARRHYTHMIAS   1. There were very rare PVCs recorded totalling 10 and averaging less than 1 per hour.   2. There were no episodes of ventricular tachycardia.   SUPRA VENTRICULAR ARRHYTHMIAS   1. There were very rare PACs totalling 91 and averaging 1 per hour.  There were 5 couplets.   2. There were no episodes of sustained supraventricular tachycardia.   SINUS NODE FUNCTION   1. There was no evidence of high grade SA isabel block.   AV CONDUCTION   1. There was no evidence of high grade AV block.   DIARY   1. The diary was returned, but not completed   MISCELLANEOUS   1. This was a tape of adequate length (71 hrs).      Treadmill stress test results dated 9/19/18 are:  Excellent stress test with good endurance (reached end point.)  Baseline EKG with NSR, CBBB, no escape beats. No ischemia or CP. Normal recovery       Assessment:  1. S/P VSD closure    2. S/P repair of PDA    3. Status post suture closure ASD    4. Complete right bundle branch block (RBBB)    5. At high risk for cardiac arrhythmia    6. 12 weeks gestation of pregnancy        Discussion/Plan:   Soy Davis is a 27 y.o. female who is s/p patch closure of a VSD, suture closure of an ASD, double ligation of a PDA, and RBBB. She has not been here in 3 years and is now 3 months pregnant. She is doing well without c/o (nausea early in the pregnancy.) She will need an echo, fetal echo, 3 day Holter. Will f/u post delivery pending studies. Dr. Yun would like her to see Dr. Mynor Mendez.     3 day Holter placed today. Echo at [facility] scheduled 9/27/22 at 1:30 pm. Will work on Fetal echo scheduling.     I have reviewed our general guidelines related to cardiac issues with the family.  I instructed them in the event of an emergency to call 911 or go to the nearest emergency room.  They know to contact the PCP if problems arise or if they are in doubt. The patient should see a dentist every 6 months for routine  dental care.    Follow up with the primary care provider for the following issues: Nothing identified.    Activity:She can participate in normal age-appropriate activities. She should be allowed to set her own pace and rest if fatigued.    No endocarditis prophylaxis is recommended in this circumstance.     I spent over 45 minutes with the patient. Over 50% of the time was spent counseling the patient and family member.    Patient or family member was asked to call the office within 3 days of any testing for results.     Dr. Yun reviewed history and physical exam. He then performed the physical exam. He discussed the findings with the patient's caregiver(s), and answered all questions. I have reviewed our general guidelines related to cardiac issues with the family. I instructed them in the event of an emergency to call 911 or go to the nearest emergency room. They know to contact the PCP if problems arise or if they are in doubt.    Medications:   Current Outpatient Medications   Medication Sig    norethindrone-e.estradiol-iron 1 mg-20 mcg(24) /75 mg (4) Chew once daily.     No current facility-administered medications for this visit.        Orders:   Orders Placed This Encounter   Procedures    3-14 Day Pediatric Holter Monitor       Follow-Up:     To Dr. Mynor Mendez. Echo, Fetal echo, and 3 day Holter today.       Sincerely,  Stephen Yun MD    Note Contributing Authors:  MD Devyn Randolph FNP-C  This documentation was created using [employer] voice recognition software. Content is subject to voice recognition errors.    08/24/2022    Attestation: Stephen Yun MD    I have reviewed the records and agree with the above.

## 2022-08-24 NOTE — PATIENT INSTRUCTIONS
Stephen Yun MD  Pediatric Cardiology  [address] [place] [address]  [phone]    General Guidelines    Name: Soy Davis                   : 1995    Diagnosis:   1. S/P VSD closure    2. S/P repair of PDA    3. Status post suture closure ASD    4. Complete right bundle branch block (RBBB)    5. At high risk for cardiac arrhythmia    6. 12 weeks gestation of pregnancy        PCP: Evaristo Gannon Jr, MD  PCP Phone Number: [phone]    If you have an emergency or you think you have an emergency, go to the nearest emergency room!     Breathing too fast, doesnt look right, consistently not eating well, your child needs to be checked. These are general indications that your child is not feeling well. This may be caused by anything, a stomach virus, an ear ache or heart disease, so please call Evaristo Gannon Jr, MD. If Evaristo Gannon Jr, MD thinks you need to be checked for your heart, they will let us know.     If your child experiences a rapid or very slow heart rate and has the following symptoms, call Evaristo Gannon Jr, MD or go to the nearest emergency room.   unexplained chest pain   does not look right   feels like they are going to pass out   actually passes out for unexplained reasons   weakness or fatigue   shortness of breath  or breathing fast   consistent poor feeding     If your child experiences a rapid or very slow heart rate that lasts longer than 30 minutes call Evaristo Gannon Jr, MD or go to the nearest emergency room.     If your child feels like they are going to pass out - have them sit down or lay down immediately. Raise the feet above the head (prop the feet on a chair or the wall) until the feeling passes. Slowly allow the child to sit, then stand. If the feeling returns, lay back down and start over.     It is very important that you notify Evaristo Gannon Jr, MD first. Evaristo Gannon Jr  MD or the ER Physician can reach Dr. Stephen Yun at the office or through [facility] at [phone] as needed.    Call our office ([phone]) one week after ALL tests for results.

## 2022-09-04 LAB
OHS CV EVENT MONITOR DAY: 3
OHS CV HOLTER HOOKUP DATE: NORMAL
OHS CV HOLTER HOOKUP TIME: NORMAL
OHS CV HOLTER LENGTH DECIMAL HOURS: 72
OHS CV HOLTER LENGTH HOURS: 0
OHS CV HOLTER LENGTH MINUTES: 0
OHS CV HOLTER SCAN DATE: NORMAL
OHS CV HOLTER SINUS AVERAGE HR: 83 BPM
OHS CV HOLTER SINUS MAX HR: 168 BPM
OHS CV HOLTER SINUS MIN HR: 48 BPM
OHS CV HOLTER STUDY END DATE: NORMAL
OHS CV HOLTER STUDY END TIME: NORMAL

## 2022-10-03 ENCOUNTER — DOCUMENTATION ONLY (OUTPATIENT)
Dept: PEDIATRIC CARDIOLOGY | Facility: CLINIC | Age: 27
End: 2022-10-03
Payer: COMMERCIAL

## 2022-10-03 NOTE — PROGRESS NOTES
Reviewed echo dated 9/27/22 with Dr. Yun. Moderate TR, unchanged from 2018. Will plan to see in Nov (11/22/22 at 2:30 pm.) reviewed with pt. Still has not heard about fetal echo. Will inquire again.     4 Chambers with normally aligned great vessels  Qualitatively normal chamber sizes  No LVH noted  Minimal flattened IVS motion  EF (Teich): 65 %  MV E/A: 2.2  Good LV function  No LVOTO  No RVOTO  Aortic Valve Normal  Pulmonary Valve Normal  Mitral Valve Normal  Tricuspid Valve with myxomatous changes?? & a smaller septal leaflet  Aortic Root Appears Normal  Aortic Arch Appears Normal  Descending Aorta Appears Normal  Desc Ao PG 11 mmHg   No evidence of coarctation of the aorta noted  RCA and LCA ostia are patent by 2D / CF  Normal main and branch pulmonary arteries  No PPS  PVR not imaged well  No shunts noted   LA qualitatively WNL for age   LA volume 22 ml/m2   Moderate TR; probably due to the smaller septal leaflet( Echo in 2018 had moderate TR also)  Trivial MR  RVSP 27, 29,33 mmHg   IVC and SVC to RA  Clinical Correlation Suggested   Follow-up Warranted   Review with chart & Midlevel

## 2022-11-10 DIAGNOSIS — Z87.74 STATUS POST PATCH CLOSURE OF ASD: ICD-10-CM

## 2022-11-10 DIAGNOSIS — Z87.74 S/P REPAIR OF PDA: ICD-10-CM

## 2022-11-10 DIAGNOSIS — Z87.74 S/P VSD CLOSURE: Primary | ICD-10-CM

## 2022-11-10 DIAGNOSIS — I45.10 RIGHT BUNDLE BRANCH BLOCK: ICD-10-CM

## 2022-11-22 ENCOUNTER — OFFICE VISIT (OUTPATIENT)
Dept: PEDIATRIC CARDIOLOGY | Facility: CLINIC | Age: 27
End: 2022-11-22
Payer: COMMERCIAL

## 2022-11-22 VITALS
HEIGHT: 66 IN | BODY MASS INDEX: 22.02 KG/M2 | WEIGHT: 137 LBS | OXYGEN SATURATION: 99 % | RESPIRATION RATE: 14 BRPM | SYSTOLIC BLOOD PRESSURE: 118 MMHG | DIASTOLIC BLOOD PRESSURE: 66 MMHG

## 2022-11-22 DIAGNOSIS — I07.1 TRICUSPID VALVE INSUFFICIENCY, UNSPECIFIED ETIOLOGY: ICD-10-CM

## 2022-11-22 DIAGNOSIS — Z87.74 S/P VSD CLOSURE: ICD-10-CM

## 2022-11-22 DIAGNOSIS — Z87.74 STATUS POST PATCH CLOSURE OF ASD: ICD-10-CM

## 2022-11-22 DIAGNOSIS — I45.10 RIGHT BUNDLE BRANCH BLOCK: ICD-10-CM

## 2022-11-22 DIAGNOSIS — Z87.74 S/P REPAIR OF PDA: ICD-10-CM

## 2022-11-22 PROCEDURE — 1159F PR MEDICATION LIST DOCUMENTED IN MEDICAL RECORD: ICD-10-PCS | Mod: CPTII,S$GLB,, | Performed by: PHYSICIAN ASSISTANT

## 2022-11-22 PROCEDURE — 3078F PR MOST RECENT DIASTOLIC BLOOD PRESSURE < 80 MM HG: ICD-10-PCS | Mod: CPTII,S$GLB,, | Performed by: PHYSICIAN ASSISTANT

## 2022-11-22 PROCEDURE — 3008F BODY MASS INDEX DOCD: CPT | Mod: CPTII,S$GLB,, | Performed by: PHYSICIAN ASSISTANT

## 2022-11-22 PROCEDURE — 93000 EKG 12-LEAD: ICD-10-PCS | Mod: S$GLB,,, | Performed by: PEDIATRICS

## 2022-11-22 PROCEDURE — 3074F SYST BP LT 130 MM HG: CPT | Mod: CPTII,S$GLB,, | Performed by: PHYSICIAN ASSISTANT

## 2022-11-22 PROCEDURE — 3078F DIAST BP <80 MM HG: CPT | Mod: CPTII,S$GLB,, | Performed by: PHYSICIAN ASSISTANT

## 2022-11-22 PROCEDURE — 93000 ELECTROCARDIOGRAM COMPLETE: CPT | Mod: S$GLB,,, | Performed by: PEDIATRICS

## 2022-11-22 PROCEDURE — 1160F PR REVIEW ALL MEDS BY PRESCRIBER/CLIN PHARMACIST DOCUMENTED: ICD-10-PCS | Mod: CPTII,S$GLB,, | Performed by: PHYSICIAN ASSISTANT

## 2022-11-22 PROCEDURE — 3008F PR BODY MASS INDEX (BMI) DOCUMENTED: ICD-10-PCS | Mod: CPTII,S$GLB,, | Performed by: PHYSICIAN ASSISTANT

## 2022-11-22 PROCEDURE — 99214 OFFICE O/P EST MOD 30 MIN: CPT | Mod: 25,S$GLB,, | Performed by: PHYSICIAN ASSISTANT

## 2022-11-22 PROCEDURE — 1160F RVW MEDS BY RX/DR IN RCRD: CPT | Mod: CPTII,S$GLB,, | Performed by: PHYSICIAN ASSISTANT

## 2022-11-22 PROCEDURE — 1159F MED LIST DOCD IN RCRD: CPT | Mod: CPTII,S$GLB,, | Performed by: PHYSICIAN ASSISTANT

## 2022-11-22 PROCEDURE — 99214 PR OFFICE/OUTPT VISIT, EST, LEVL IV, 30-39 MIN: ICD-10-PCS | Mod: 25,S$GLB,, | Performed by: PHYSICIAN ASSISTANT

## 2022-11-22 PROCEDURE — 3074F PR MOST RECENT SYSTOLIC BLOOD PRESSURE < 130 MM HG: ICD-10-PCS | Mod: CPTII,S$GLB,, | Performed by: PHYSICIAN ASSISTANT

## 2022-11-22 NOTE — PROGRESS NOTES
[facility]  Soy Davis  1995    Soy Davis is a 27 y.o. female presenting for follow-up of   1. S/P VSD closure    2. S/P repair of PDA    3. Status post suture closure ASD    4. Complete right bundle branch block (RBBB)    5. At high risk for cardiac arrhythmia    6. 12 weeks gestation of pregnancy     Soy is here today with her mother.    HPI  Soy Davis was CHD at birth, including VSD, PDA, and ASD. She was submitted for surgical repair at 2 months of age ([birth year], [facility]) including patch closure of VSD, suture closure of ASD, double ligation of PDA and has done very well.Past Holter revealed some bradycardia with junctional escape beats.  She had stress test in September 2018 that was normal without escape beats.    She was last seen 8/24/22. She was 12 week pregnant.  Echo, fetal echo, and holter were ordered. She was also scheduled to see Dr. Mendez.  Echo 9/27/22 showed Tricuspid Valve with myxomatous changes?? & a smaller septal leaflet, moderate TR (stable with 2018 echo), RVSP 27-33 mmHg, Minimal flattened IVS motion. Fetal echo showed no evidence of a fetal cardiac anomaly. Recommended echo after birth for the baby.  Holter showed: 1.8% isolated PVCs.      Soy has been doing well since last visit. She is 25 weeks. Pregnancy uncomplicated. She is due March 9th. Ob is Dr. Travis. She is having a boy, .   Soy has a lot of energy and does not get short of breath with activity.  Denies any recent illness, surgeries, or hospitalizations.    There are no reports of chest pain, chest pain with exertion, cyanosis, exercise intolerance, dyspnea, fatigue, palpitations, syncope, and tachypnea. No other cardiovascular or medical concerns are reported.      Medications:   Medication List with Changes/Refills   Current Medications    PNV NO.95/FERROUS FUM/FOLIC AC (PRENATAL ORAL)    Take by mouth.   Discontinued Medications    NORETHINDRONE-E.ESTRADIOL-IRON 1  MG-20 MCG(24) /75 MG (4) CHEW    once daily.      Allergies: Review of patient's allergies indicates:  No Known Allergies  Family History   Problem Relation Age of Onset    No Known Problems Mother     No Known Problems Father     No Known Problems Maternal Grandmother     No Known Problems Maternal Grandfather     No Known Problems Paternal Grandmother     No Known Problems Paternal Grandfather     Cardiomyopathy Neg Hx     Early death Neg Hx     Congenital heart disease Neg Hx     Arrhythmia Neg Hx     Heart attacks under age 50 Neg Hx     Hypertension Neg Hx     Long QT syndrome Neg Hx     Pacemaker/defibrilator Neg Hx      Past Medical History:   Diagnosis Date    ASD (atrial septal defect)     s/p suture closure    Complete right bundle branch block     PDA (patent ductus arteriosus)     s/p double ligation    Pregnancy, not yet delivered     Tricuspid regurgitation     Tricuspid valve disorder     smaller septal leaflet    VSD (ventricular septal defect)     s/p patch closure     Social History     Social History Narrative    Works at [employer].       Past Surgical History:   Procedure Laterality Date    CARDIAC SURGERY  1997    [facility]: VSD- s/p patch closure; ASD- s/p suture closure; PDA- s/p double ligation    DENTAL SURGERY       Birth History    Birth     Weight: 2.92 kg (6 lb 7 oz)    Gestation Age: 40 wks       There is no immunization history on file for this patient.  Immunizations were reviewed today and if not current, recommend follow up with the PCP for further management.  Past medical history, family history, surgical history, social history updated and reviewed today.     Review of Systems  GENERAL: No fever, chills, fatigability, malaise, or weight loss.  CHEST: Denies GARSIA, cyanosis, wheezing, cough, sputum production, or SOB.  CARDIOVASCULAR: Denies chest pain, palpitations, diaphoresis, SOB, or reduced exercise tolerance.  Endocrine: Denies polyphagia, polydipsia, or polyuria  Skin:  "Denies rashes or color change  HENT: Negative for congestion, headaches and sore throat.   ABDOMEN: Appetite fine. No weight loss. Denies diarrhea, abdominal pain, nausea, or vomiting.  PERIPHERAL VASCULAR: No edema, varicosities, or cyanosis.  Musculoskeletal: Negative for muscle weakness and stiffness.  NEUROLOGIC: no dizziness, no history of syncope by report, no headache   Psychiatric/Behavioral: Negative for altered mental status. The patient is not nervous/anxious.   Allergic/Immunologic: Negative for environmental allergies.   : dysuria, hematuria, polyuria    Objective:   /66 (BP Location: Right arm, Patient Position: Sitting, BP Method: Large (Manual))   Resp 14   Ht 5' 6" (1.676 m)   Wt 62.1 kg (137 lb 0.3 oz)   SpO2 99%   BMI 22.11 kg/m²   Body surface area is 1.7 meters squared.  Growth percentile SmartLinks can only be used for patients less than 20 years old.    Physical Exam  GENERAL: Awake, well-developed well-nourished, no apparent distress  HEENT: mucous membranes moist and pink, normocephalic, no cranial or carotid bruits, sclera anicteric  NECK:  no lymphadenopathy  CHEST: Good air movement, clear to auscultation bilaterally  CARDIOVASCULAR: Quiet precordium, regular rate and rhythm, , No S3 or S4, no rubs or gallops. No clicks or rumbles. No cardiomegaly by palpation. Grade 1/6 trivial systolic murmur noted at the LSB  ABDOMEN: Soft, nontender nondistended, no hepatosplenomegaly, no aortic bruits  EXTREMITIES: Warm well perfused, 2+ radial/pedal/femoral pulses, capillary refill 2 seconds, no clubbing, cyanosis, or edema  NEURO: Alert and oriented, cooperative with exam, face symmetric, moves all extremities well.  Skin: pink, turgor WNL  Vitals reviewed     Tests:   Today's EKG interpretation by Dr. Yun reveals:   NSR   RBBB  Short OH  (Final report in electronic medical record)      Echocardiogram:   Pertinent echocardiographic findings from the echo dated 9/27/22 are:   4 " Chambers with normally aligned great vessels  Qualitatively normal chamber sizes  No LVH noted  Minimal flattened IVS motion  EF (Teich): 65 %  MV E/A: 2.2  Good LV function  No LVOTO  No RVOTO  Aortic Valve Normal  Pulmonary Valve Normal  Mitral Valve Normal  Tricuspid Valve with myxomatous changes?? & a smaller septal leaflet  Aortic Root Appears Normal  Aortic Arch Appears Normal  Descending Aorta Appears Normal  Desc Ao PG 11 mmHg   No evidence of coarctation of the aorta noted  RCA and LCA ostia are patent by 2D / CF  Normal main and branch pulmonary arteries  No PPS  PVR not imaged well  No shunts noted   LA qualitatively WNL for age   LA volume 22 ml/m2   Moderate TR; probably due to the smaller septal leaflet( Echo in 2018 had moderate TR also)  Trivial MR  RVSP 27, 29,33 mmHg   IVC and SVC to RA  Clinical Correlation Suggested   Follow-up Warranted   Review with chart & Midlevel  (Full report in electronic medical record)    Echo 8/24/22  Conclusion  Sinus rhythm throughout.  HR Range:  (avg 83) bpm.  No patient-triggered events.  No significant atrial ectopy burden.  Occasional ventricular ectopy  1.8% isolated PVCs  Rare ventricular couplets without triplets    Assessment:  Patient Active Problem List   Diagnosis    S/P VSD closure    Status post suture closure ASD    S/P repair of PDA    Right bundle branch block    Tricuspid regurgitation  Pregnancy 25 weeks       Discussion/ Plan:   Dr. Yun reviewed history and physical exam. He then performed the physical exam. He discussed the findings with the patient's caregiver(s), and answered all questions. Dr. Yun and I have reviewed our general guidelines related to cardiac issues with the family.  I instructed them in the event of an emergency to call 911 or go to the nearest emergency room.  They know to contact the PCP if problems arise or if they are in doubt.    Soy is followed in clinic for VSD s/p patch closure, ASD s/p suture closure, PDA  s/p suture closure, PSA s/p double ligation. EKG unchanged with RBBB.  Echo 9/27/22 showed Tricuspid Valve with myxomatous changes?? & a smaller septal leaflet, moderate TR (stable with 2018 echo), RVSP 27-33 mmHg, Minimal flattened IVS motion. Holter showed: 1.8% isolated PVCs.  She is 25 weeks pregnant. Fetal echo showed no evidence of a fetal cardiac anomaly. Dr. Travis recommended echo after birth for the baby.  Dr. Yun would like to be notified when the baby is born. As long as the infant is doing well after birth, will plan for echo and visit 1 month post d/c.  Dr. Yun would like her to see Dr. Mynor Mendez since she is an adult. Will plan to see her in Feb 2023. However, if she sees Dr. Mendez before then, will cancel her appointment with Dr. Yun. She will see Dr. Yun PRN after her visit with Dr. Mendez. She will alert us with any concerns.     I spent a total of 30 minutes on the day of the visit.  This includes face to face time and non-face to face time preparing to see the patient (eg, review of tests), obtaining and/or reviewing separately obtained history, documenting clinical information in the electronic or other health record, independently interpreting results and communicating results to the patient/family/caregiver, or care coordinator.     Activity:She can participate in normal age-appropriate activities. She should be allowed to set .his own pace and rest if fatigued.       No endocarditis prophylaxis is recommended in this circumstance.      Medications:   Medication List with Changes/Refills   Current Medications    PNV NO.95/FERROUS FUM/FOLIC AC (PRENATAL ORAL)    Take by mouth.   Discontinued Medications    NORETHINDRONE-E.ESTRADIOL-IRON 1 MG-20 MCG(24) /75 MG (4) CHEW    once daily.         Orders placed this encounter  No orders of the defined types were placed in this encounter.      Follow-Up:   Return to clinic in Feb 2023 or sooner if there are any concerns    Sincerely,  Stephen Yun,  MD    Note Contributing Authors:  MD Tamica Randolph PA-C  11/22/2022    Attestation: Stephen Yun MD  I have reviewed the records and agree with the above. I have examined the patient and discussed the findings with the family in attendance. All questions were answered to their satisfaction. I agree with the plan and the follow up instructions.

## 2022-11-22 NOTE — PATIENT INSTRUCTIONS
Stephen Yun MD  Pediatric Cardiology  [address] [place] [address]  [phone]    General Guidelines    Name: Soy Davis                   : 1995    Diagnosis:   1. S/P VSD closure    2. Status post suture closure ASD    3. Right bundle branch block    4. S/P repair of PDA        PCP: ROE Berg  PCP Phone Number: [phone]    If you have an emergency or you think you have an emergency, go to the nearest emergency room!     Breathing too fast, doesnt look right, consistently not eating well, your child needs to be checked. These are general indications that your child is not feeling well. This may be caused by anything, a stomach virus, an ear ache or heart disease, so please call ROE Berg. If ROE Berg thinks you need to be checked for your heart, they will let us know.     If your child experiences a rapid or very slow heart rate and has the following symptoms, call ROE Berg or go to the nearest emergency room.   unexplained chest pain   does not look right   feels like they are going to pass out   actually passes out for unexplained reasons   weakness or fatigue   shortness of breath  or breathing fast   consistent poor feeding     If your child experiences a rapid or very slow heart rate that lasts longer than 30 minutes call ROE Berg or go to the nearest emergency room.     If your child feels like they are going to pass out - have them sit down or lay down immediately. Raise the feet above the head (prop the feet on a chair or the wall) until the feeling passes. Slowly allow the child to sit, then stand. If the feeling returns, lay back down and start over.     It is very important that you notify ROE Berg first. ROE Berg or the ER Physician can reach Dr. Stephen Yun at the office or through [facility] P[facility] at [phone] as needed.    Call our office ([phone]) one  week after ALL tests for results.

## 2023-02-09 DIAGNOSIS — Z87.74 S/P REPAIR OF PDA: ICD-10-CM

## 2023-02-09 DIAGNOSIS — Z87.74 S/P VSD CLOSURE: Primary | ICD-10-CM

## 2023-02-09 DIAGNOSIS — Z87.74 STATUS POST PATCH CLOSURE OF ASD: ICD-10-CM

## 2023-02-09 DIAGNOSIS — I45.10 RIGHT BUNDLE BRANCH BLOCK: ICD-10-CM

## 2023-02-21 ENCOUNTER — OFFICE VISIT (OUTPATIENT)
Dept: PEDIATRIC CARDIOLOGY | Facility: CLINIC | Age: 28
End: 2023-02-21
Payer: COMMERCIAL

## 2023-02-21 VITALS
WEIGHT: 150.69 LBS | DIASTOLIC BLOOD PRESSURE: 72 MMHG | HEIGHT: 68 IN | HEART RATE: 82 BPM | OXYGEN SATURATION: 99 % | SYSTOLIC BLOOD PRESSURE: 114 MMHG | RESPIRATION RATE: 16 BRPM | BODY MASS INDEX: 22.84 KG/M2

## 2023-02-21 DIAGNOSIS — I45.10 RIGHT BUNDLE BRANCH BLOCK: ICD-10-CM

## 2023-02-21 DIAGNOSIS — Z87.74 STATUS POST PATCH CLOSURE OF ASD: ICD-10-CM

## 2023-02-21 DIAGNOSIS — Z87.74 S/P REPAIR OF PDA: ICD-10-CM

## 2023-02-21 DIAGNOSIS — Z3A.38 38 WEEKS GESTATION OF PREGNANCY: ICD-10-CM

## 2023-02-21 DIAGNOSIS — Z87.74 S/P VSD CLOSURE: ICD-10-CM

## 2023-02-21 PROCEDURE — 99214 OFFICE O/P EST MOD 30 MIN: CPT | Mod: 25,S$GLB,, | Performed by: PHYSICIAN ASSISTANT

## 2023-02-21 PROCEDURE — 1159F PR MEDICATION LIST DOCUMENTED IN MEDICAL RECORD: ICD-10-PCS | Mod: CPTII,S$GLB,, | Performed by: PHYSICIAN ASSISTANT

## 2023-02-21 PROCEDURE — 3008F BODY MASS INDEX DOCD: CPT | Mod: CPTII,S$GLB,, | Performed by: PHYSICIAN ASSISTANT

## 2023-02-21 PROCEDURE — 3074F SYST BP LT 130 MM HG: CPT | Mod: CPTII,S$GLB,, | Performed by: PHYSICIAN ASSISTANT

## 2023-02-21 PROCEDURE — 3074F PR MOST RECENT SYSTOLIC BLOOD PRESSURE < 130 MM HG: ICD-10-PCS | Mod: CPTII,S$GLB,, | Performed by: PHYSICIAN ASSISTANT

## 2023-02-21 PROCEDURE — 1159F MED LIST DOCD IN RCRD: CPT | Mod: CPTII,S$GLB,, | Performed by: PHYSICIAN ASSISTANT

## 2023-02-21 PROCEDURE — 3078F DIAST BP <80 MM HG: CPT | Mod: CPTII,S$GLB,, | Performed by: PHYSICIAN ASSISTANT

## 2023-02-21 PROCEDURE — 3008F PR BODY MASS INDEX (BMI) DOCUMENTED: ICD-10-PCS | Mod: CPTII,S$GLB,, | Performed by: PHYSICIAN ASSISTANT

## 2023-02-21 PROCEDURE — 1160F PR REVIEW ALL MEDS BY PRESCRIBER/CLIN PHARMACIST DOCUMENTED: ICD-10-PCS | Mod: CPTII,S$GLB,, | Performed by: PHYSICIAN ASSISTANT

## 2023-02-21 PROCEDURE — 93000 EKG 12-LEAD: ICD-10-PCS | Mod: S$GLB,,, | Performed by: PEDIATRICS

## 2023-02-21 PROCEDURE — 1160F RVW MEDS BY RX/DR IN RCRD: CPT | Mod: CPTII,S$GLB,, | Performed by: PHYSICIAN ASSISTANT

## 2023-02-21 PROCEDURE — 3078F PR MOST RECENT DIASTOLIC BLOOD PRESSURE < 80 MM HG: ICD-10-PCS | Mod: CPTII,S$GLB,, | Performed by: PHYSICIAN ASSISTANT

## 2023-02-21 PROCEDURE — 93000 ELECTROCARDIOGRAM COMPLETE: CPT | Mod: S$GLB,,, | Performed by: PEDIATRICS

## 2023-02-21 PROCEDURE — 99214 PR OFFICE/OUTPT VISIT, EST, LEVL IV, 30-39 MIN: ICD-10-PCS | Mod: 25,S$GLB,, | Performed by: PHYSICIAN ASSISTANT

## 2023-02-21 NOTE — PATIENT INSTRUCTIONS
Stephen Yun MD  Pediatric Cardiology  [address] [place] [address]  [phone]    General Guidelines    Name: Soy Davis                   : 1995    Diagnosis:   1. S/P VSD closure    2. Status post patch closure of ASD    3. Right bundle branch block    4. S/P repair of PDA        PCP: ROE Berg  PCP Phone Number: [phone]    If you have an emergency or you think you have an emergency, go to the nearest emergency room!     Breathing too fast, doesnt look right, consistently not eating well, your child needs to be checked. These are general indications that your child is not feeling well. This may be caused by anything, a stomach virus, an ear ache or heart disease, so please call ROE Berg. If ROE Berg thinks you need to be checked for your heart, they will let us know.     If your child experiences a rapid or very slow heart rate and has the following symptoms, call ROE Berg or go to the nearest emergency room.   unexplained chest pain   does not look right   feels like they are going to pass out   actually passes out for unexplained reasons   weakness or fatigue   shortness of breath  or breathing fast   consistent poor feeding     If your child experiences a rapid or very slow heart rate that lasts longer than 30 minutes call ROE Berg or go to the nearest emergency room.     If your child feels like they are going to pass out - have them sit down or lay down immediately. Raise the feet above the head (prop the feet on a chair or the wall) until the feeling passes. Slowly allow the child to sit, then stand. If the feeling returns, lay back down and start over.     It is very important that you notify ROE Berg first. ROE Berg or the ER Physician can reach Dr. Stephen Yun at the office or through [facility] PICU at [phone] as needed.    Call our office ([phone]) one  week after ALL tests for results.

## 2023-02-21 NOTE — PROGRESS NOTES
[facility]  Soy Davis  1995    Soy Davis is a 27 y.o. female presenting for follow-up of    S/P VSD closure    Status post suture closure ASD    S/P repair of PDA    Right bundle branch block    Tricuspid regurgitation  Pregnancy        HPI  Soy Davis was CHD at birth, including VSD, PDA, and ASD. She was submitted for surgical repair at 2 months of age ([birth year], [facility] [place]) including patch closure of VSD, suture closure of ASD, double ligation of PDA and has done very well.Past Holter revealed some bradycardia with junctional escape beats.  She had stress test in September 2018 that was normal without escape beats.   Echo 9/27/22 showed Tricuspid Valve with myxomatous changes?? & a smaller septal leaflet, moderate TR (stable with 2018 echo), RVSP 27-33 mmHg, Minimal flattened IVS motion. Fetal echo showed no evidence of a fetal cardiac anomaly. Recommended echo after birth for the baby.  Holter showed: 1.8% isolated PVCs.      She was last seen 11/22/22. Exam revealed a Grade 1/6 trivial systolic murmur noted at the LSB. EKG with RBBB and short IA. She was referred to Dr. Mendez. However, planned to see her back this month if she had not seen Dr. Mendez.     Soy has been doing well since last visit.  Soy has a lot of energy and does not get short of breath with activity. She will be 38 weeks tomorrow. Pregnancy uncomplicated. She is due March 9th. Ob is Dr. Travis and will deliver at [facility]. She is having a boy, [place]. . Denies any recent illness, surgeries, or hospitalizations.    There are no reports of chest pain, chest pain with exertion, cyanosis, exercise intolerance, dyspnea, fatigue, palpitations, syncope, and tachypnea. No other cardiovascular or medical concerns are reported.      Medications:   Medication List with Changes/Refills   Current Medications    PNV NO.95/FERROUS FUM/FOLIC AC (PRENATAL ORAL)    Take by mouth.      Allergies: Review of patient's  allergies indicates:  No Known Allergies  Family History   Problem Relation Age of Onset    No Known Problems Mother     No Known Problems Father     No Known Problems Maternal Grandmother     No Known Problems Maternal Grandfather     No Known Problems Paternal Grandmother     No Known Problems Paternal Grandfather     Cardiomyopathy Neg Hx     Early death Neg Hx     Congenital heart disease Neg Hx     Arrhythmia Neg Hx     Heart attacks under age 50 Neg Hx     Hypertension Neg Hx     Long QT syndrome Neg Hx     Pacemaker/defibrilator Neg Hx      Past Medical History:   Diagnosis Date    ASD (atrial septal defect)     s/p suture closure    Complete right bundle branch block     PDA (patent ductus arteriosus)     s/p double ligation    Pregnancy, not yet delivered     Tricuspid regurgitation     Tricuspid valve disorder     smaller septal leaflet    VSD (ventricular septal defect)     s/p patch closure     Social History     Social History Narrative    Works at [employer].       Past Surgical History:   Procedure Laterality Date    CARDIAC SURGERY  1997    [facility]: VSD- s/p patch closure; ASD- s/p suture closure; PDA- s/p double ligation    DENTAL SURGERY       Birth History    Birth     Weight: 2.92 kg (6 lb 7 oz)    Gestation Age: 40 wks       There is no immunization history on file for this patient.  Immunizations were reviewed today and if not current, recommend follow up with the PCP for further management.  Past medical history, family history, surgical history, social history updated and reviewed today.     Review of Systems  GENERAL: No fever, chills, fatigability, malaise, or weight loss.  CHEST: Denies GARSIA, cyanosis, wheezing, cough, sputum production, or SOB.  CARDIOVASCULAR: Denies chest pain, palpitations, diaphoresis, SOB, or reduced exercise tolerance.  Endocrine: Denies polyphagia, polydipsia, or polyuria  Skin: Denies rashes or color change  HENT: Negative for congestion, headaches and sore  "throat.   ABDOMEN: Appetite fine. No weight loss. Denies diarrhea, abdominal pain, nausea, or vomiting.  PERIPHERAL VASCULAR: No edema, varicosities, or cyanosis.  Musculoskeletal: Negative for muscle weakness and stiffness.  NEUROLOGIC: no dizziness, no history of syncope by report, no headache   Psychiatric/Behavioral: Negative for altered mental status. The patient is not nervous/anxious.   Allergic/Immunologic: Negative for environmental allergies.   : dysuria, hematuria, polyuria    Objective:   /72 (BP Location: Right arm, Patient Position: Sitting, BP Method: Large (Manual))   Pulse 82   Resp 16   Ht 5' 7.72" (1.72 m)   Wt 68.3 kg (150 lb 11 oz)   SpO2 99%   BMI 23.10 kg/m²   Body surface area is 1.81 meters squared.  Growth percentile SmartLinks can only be used for patients less than 20 years old.    Physical Exam  GENERAL: Awake, well-developed well-nourished, no apparent distress  HEENT: mucous membranes moist and pink, normocephalic, no cranial or carotid bruits, sclera anicteric  NECK:  no lymphadenopathy  CHEST: Good air movement, clear to auscultation bilaterally  CARDIOVASCULAR: Quiet precordium, regular rate and rhythm, single S1, split S2, normal P2, No S3 or S4, no rubs or gallops. No clicks or rumbles. No cardiomegaly by palpation. Grade 1/6 PEM r noted at the ULSB. Grade 1/6 TR murmur at the LLSB  ABDOMEN: Soft, nontender nondistended, no hepatosplenomegaly, no aortic bruits  EXTREMITIES: Warm well perfused, 2+ radial/pedal/femoral pulses, capillary refill 2 seconds, no clubbing, cyanosis, trace edema of the ankles   NEURO: Alert and oriented, cooperative with exam, face symmetric, moves all extremities well.  Skin: pink, turgor WNL  Vitals reviewed     Tests:   Today's EKG interpretation by Dr. Yun reveals:   NSR   RBBBB  (Final report in electronic medical record)    Echocardiogram:   Pertinent echocardiographic findings from the echo dated 9/27/22 are:   4 Chambers with " normally aligned great vessels  Qualitatively normal chamber sizes  No LVH noted  Minimal flattened IVS motion  EF (Teich): 65 %  MV E/A: 2.2  Good LV function  No LVOTO  No RVOTO  Aortic Valve Normal  Pulmonary Valve Normal  Mitral Valve Normal  Tricuspid Valve with myxomatous changes?? & a smaller septal leaflet  Aortic Root Appears Normal  Aortic Arch Appears Normal  Descending Aorta Appears Normal  Desc Ao PG 11 mmHg   No evidence of coarctation of the aorta noted  RCA and LCA ostia are patent by 2D / CF  Normal main and branch pulmonary arteries  No PPS  PVR not imaged well  No shunts noted   LA qualitatively WNL for age   LA volume 22 ml/m2   Moderate TR; probably due to the smaller septal leaflet( Echo in 2018 had moderate TR also)  Trivial MR  RVSP 27, 29,33 mmHg   IVC and SVC to RA  Clinical Correlation Suggested   Follow-up Warranted   Review with chart & Midlevel  (Full report in electronic medical record)     Echo 8/24/22  Conclusion  Sinus rhythm throughout.  HR Range:  (avg 83) bpm.  No patient-triggered events.  No significant atrial ectopy burden.  Occasional ventricular ectopy  1.8% isolated PVCs  Rare ventricular couplets without triplets    Assessment:  Patient Active Problem List   Diagnosis    S/P VSD closure    Status post suture closure ASD    S/P repair of PDA    Right bundle branch block    EKG, abnormal    Echocardiogram abnormal    Tricuspid regurgitation    Pregnancy       Discussion/ Plan:   Dr. Yun reviewed history and physical exam. He then performed the physical exam. He discussed the findings with the patient's caregiver(s), and answered all questions. Dr. Yun and I have reviewed our general guidelines related to cardiac issues with the family.  I instructed them in the event of an emergency to call 911 or go to the nearest emergency room.  They know to contact the PCP if problems arise or if they are in doubt.    Soy is followed in clinic for VSD s/p patch closure, ASD  s/p suture closure, PDA s/p suture closure, PSA s/p double ligation. EKG unchanged with RBBB.  Echo 9/27/22 showed Tricuspid Valve with myxomatous changes?? & a smaller septal leaflet, moderate TR (stable with 2018 echo), RVSP 27-33 mmHg, Minimal flattened IVS motion. Holter showed: 1.8% isolated PVCs.  She will be 38  weeks tomorrow. Fetal echo showed no evidence of a fetal cardiac anomaly. Dr. Travis recommended echo after birth for the baby.  Dr. Yun would like to be notified when the baby is born.Dr. Yun recommends an echo prior to d/c on the baby. Dr. Yun would like her to see Dr. Mynor Mendez since she is an adult. Will plan to see her in in 6-8 weeks However, if she sees Dr. Mendez before then, will cancel her appointment with Dr. Yun. She will see Dr. Yun PRN after her visit with Dr. Mendez. She will alert us with any concerns.       Activity:She can participate in normal age-appropriate activities. She should be allowed to set .his own pace and rest if fatigued.     No endocarditis prophylaxis is recommended in this circumstance.      Medications:   Medication List with Changes/Refills   Current Medications    PNV NO.95/FERROUS FUM/FOLIC AC (PRENATAL ORAL)    Take by mouth.       Orders placed this encounter  Orders Placed This Encounter   Procedures    EKG 12-lead    Pediatric Echo Limited Echo? No     Follow-Up:   Return to clinic in 6-8 weeks with EKG and echo or sooner if there are any concerns    Sincerely,  Stephen Yun MD    Note Contributing Authors:  MD Tamica Randolph PA-C  02/21/2023    Attestation: Stephen Yun MD  I have reviewed the records and agree with the above. I have examined the patient and discussed the findings with the family in attendance. All questions were answered to their satisfaction. I agree with the plan and the follow up instructions.

## 2023-04-19 ENCOUNTER — CLINICAL SUPPORT (OUTPATIENT)
Dept: PEDIATRIC CARDIOLOGY | Facility: CLINIC | Age: 28
End: 2023-04-19
Payer: COMMERCIAL

## 2023-04-19 ENCOUNTER — OFFICE VISIT (OUTPATIENT)
Dept: PEDIATRIC CARDIOLOGY | Facility: CLINIC | Age: 28
End: 2023-04-19
Payer: COMMERCIAL

## 2023-04-19 VITALS
HEART RATE: 44 BPM | SYSTOLIC BLOOD PRESSURE: 108 MMHG | BODY MASS INDEX: 19.26 KG/M2 | RESPIRATION RATE: 16 BRPM | OXYGEN SATURATION: 99 % | WEIGHT: 122.69 LBS | HEIGHT: 67 IN | DIASTOLIC BLOOD PRESSURE: 62 MMHG

## 2023-04-19 DIAGNOSIS — Z87.74 STATUS POST PATCH CLOSURE OF ASD: ICD-10-CM

## 2023-04-19 DIAGNOSIS — Z87.74 S/P REPAIR OF PDA: ICD-10-CM

## 2023-04-19 DIAGNOSIS — Z87.74 S/P VSD CLOSURE: ICD-10-CM

## 2023-04-19 DIAGNOSIS — I07.1 TRICUSPID VALVE INSUFFICIENCY, UNSPECIFIED ETIOLOGY: ICD-10-CM

## 2023-04-19 DIAGNOSIS — I45.10 RIGHT BUNDLE BRANCH BLOCK: ICD-10-CM

## 2023-04-19 PROCEDURE — 1160F PR REVIEW ALL MEDS BY PRESCRIBER/CLIN PHARMACIST DOCUMENTED: ICD-10-PCS | Mod: CPTII,S$GLB,, | Performed by: NURSE PRACTITIONER

## 2023-04-19 PROCEDURE — 3074F SYST BP LT 130 MM HG: CPT | Mod: CPTII,S$GLB,, | Performed by: NURSE PRACTITIONER

## 2023-04-19 PROCEDURE — 1160F RVW MEDS BY RX/DR IN RCRD: CPT | Mod: CPTII,S$GLB,, | Performed by: NURSE PRACTITIONER

## 2023-04-19 PROCEDURE — 3078F PR MOST RECENT DIASTOLIC BLOOD PRESSURE < 80 MM HG: ICD-10-PCS | Mod: CPTII,S$GLB,, | Performed by: NURSE PRACTITIONER

## 2023-04-19 PROCEDURE — 3008F BODY MASS INDEX DOCD: CPT | Mod: CPTII,S$GLB,, | Performed by: NURSE PRACTITIONER

## 2023-04-19 PROCEDURE — 99214 OFFICE O/P EST MOD 30 MIN: CPT | Mod: 25,S$GLB,, | Performed by: NURSE PRACTITIONER

## 2023-04-19 PROCEDURE — 1159F MED LIST DOCD IN RCRD: CPT | Mod: CPTII,S$GLB,, | Performed by: NURSE PRACTITIONER

## 2023-04-19 PROCEDURE — 93000 ELECTROCARDIOGRAM COMPLETE: CPT | Mod: S$GLB,,, | Performed by: PEDIATRICS

## 2023-04-19 PROCEDURE — 3078F DIAST BP <80 MM HG: CPT | Mod: CPTII,S$GLB,, | Performed by: NURSE PRACTITIONER

## 2023-04-19 PROCEDURE — 3008F PR BODY MASS INDEX (BMI) DOCUMENTED: ICD-10-PCS | Mod: CPTII,S$GLB,, | Performed by: NURSE PRACTITIONER

## 2023-04-19 PROCEDURE — 93000 EKG 12-LEAD: ICD-10-PCS | Mod: S$GLB,,, | Performed by: PEDIATRICS

## 2023-04-19 PROCEDURE — 1159F PR MEDICATION LIST DOCUMENTED IN MEDICAL RECORD: ICD-10-PCS | Mod: CPTII,S$GLB,, | Performed by: NURSE PRACTITIONER

## 2023-04-19 PROCEDURE — 3074F PR MOST RECENT SYSTOLIC BLOOD PRESSURE < 130 MM HG: ICD-10-PCS | Mod: CPTII,S$GLB,, | Performed by: NURSE PRACTITIONER

## 2023-04-19 PROCEDURE — 99214 PR OFFICE/OUTPT VISIT, EST, LEVL IV, 30-39 MIN: ICD-10-PCS | Mod: 25,S$GLB,, | Performed by: NURSE PRACTITIONER

## 2023-04-19 NOTE — PATIENT INSTRUCTIONS
Stephen Yun MD  Pediatric Cardiology  [address] [place] [address]  [phone]    General Guidelines    Name: Soy Davis                   : 1995    Diagnosis:   1. S/P VSD closure    2. Status post patch closure of ASD    3. S/P repair of PDA    4. Right bundle branch block    5. Tricuspid valve insufficiency, unspecified etiology        PCP: ROE Berg  PCP Phone Number: [phone]    If you have an emergency or you think you have an emergency, go to the nearest emergency room!     Breathing too fast, doesnt look right, consistently not eating well, your child needs to be checked. These are general indications that your child is not feeling well. This may be caused by anything, a stomach virus, an ear ache or heart disease, so please call ROE Berg. If ROE Berg thinks you need to be checked for your heart, they will let us know.     If your child experiences a rapid or very slow heart rate and has the following symptoms, call ROE Berg or go to the nearest emergency room.   unexplained chest pain   does not look right   feels like they are going to pass out   actually passes out for unexplained reasons   weakness or fatigue   shortness of breath  or breathing fast   consistent poor feeding     If your child experiences a rapid or very slow heart rate that lasts longer than 30 minutes call ROE Berg or go to the nearest emergency room.     If your child feels like they are going to pass out - have them sit down or lay down immediately. Raise the feet above the head (prop the feet on a chair or the wall) until the feeling passes. Slowly allow the child to sit, then stand. If the feeling returns, lay back down and start over.     It is very important that you notify ROE Berg first. ROE Berg or the ER Physician can reach Dr. Stephen Yun at the office or through [facility] PICU at  [phone] as needed.    Call our office ([phone]) one week after ALL tests for results.

## 2023-04-19 NOTE — PROGRESS NOTES
[facility]  Soy Davis  1995    Soy Davis is a 28 y.o. female presenting for follow-up of s/p repair of VSD, ASD, PDA, with RBBB, TR, now s/p delivery of a healthy boy.  Soy is here today with her mother.    HPI  Soy Davis was diagnosed with CHD at birth, including VSD, PDA, and ASD. She was submitted for surgical repair at 2 months of age ([birth year], [facility] [place]) including patch closure of VSD, suture closure of ASD, double ligation of PDA and has done very well. Past Holter revealed some bradycardia with junctional escape beats.  She had stress test in September 2018 that was normal without escape beats.   Echo 9/27/22 showed Tricuspid Valve with myxomatous changes?? & a smaller septal leaflet, moderate TR (stable with 2018 echo), RVSP 27-33 mmHg, Minimal flattened IVS motion.    She was last seen in Feb of 2023 and at that time was doing well at 38 weeks of pregnancy. Her exam that day revealed a grade 1/6 PEM r noted at the ULSB. Grade 1/6 TR murmur at the LLSB.   EKG was unchanged with NSR, RBBB.  She was referred to Dr. Mynor Mendez and asked to follow-up here today with echo post delivery.    Soy has been doing well since last visit. Soy has a lot of energy and does not get short of breath with activity.  Denies any recent illness, surgeries, or hospitalizations.    There are no reports of chest pain, chest pain with exertion, cyanosis, exercise intolerance, dyspnea, fatigue, palpitations, syncope, and tachypnea. No other cardiovascular or medical concerns are reported.     Current Medications:   Current Outpatient Medications on File Prior to Visit   Medication Sig Dispense Refill    [DISCONTINUED] PNV no.95/ferrous fum/folic ac (PRENATAL ORAL) Take by mouth.       No current facility-administered medications on file prior to visit.     Allergies: Review of patient's allergies indicates:  No Known Allergies      Family History   Problem Relation Age of Onset     No Known Problems Mother     No Known Problems Father     No Known Problems Maternal Grandmother     No Known Problems Maternal Grandfather     No Known Problems Paternal Grandmother     No Known Problems Paternal Grandfather     Cardiomyopathy Neg Hx     Early death Neg Hx     Congenital heart disease Neg Hx     Arrhythmia Neg Hx     Heart attacks under age 50 Neg Hx     Hypertension Neg Hx     Long QT syndrome Neg Hx     Pacemaker/defibrilator Neg Hx      Past Medical History:   Diagnosis Date    ASD (atrial septal defect)     s/p suture closure    Complete right bundle branch block     Liveborn infant by vaginal delivery 03/02/2023    PDA (patent ductus arteriosus)     s/p double ligation    Tricuspid regurgitation     Tricuspid valve disorder     smaller septal leaflet    VSD (ventricular septal defect)     s/p patch closure     Social History     Socioeconomic History    Marital status: Single   Tobacco Use    Smoking status: Never    Smokeless tobacco: Never   Social History Narrative    Works at [employer].      Past Surgical History:   Procedure Laterality Date    CARDIAC SURGERY  1997    [facility]: VSD- s/p patch closure; ASD- s/p suture closure; PDA- s/p double ligation    DENTAL SURGERY         Review of Systems    GENERAL: No fever, chills, fatigability, malaise  or weight loss.  CHEST: Denies dyspnea on exertion, cyanosis, wheezing, cough, sputum production   CARDIOVASCULAR: Denies chest pain, palpitations, diaphoresis,  or reduced exercise tolerance.  ABDOMEN: Appetite normal. Denies diarrhea, abdominal pain, nausea or vomiting.  PERIPHERAL VASCULAR: No edema or cyanosis.  NEUROLOGIC: no dizziness, no syncope , no headache   MUSCULOSKELETAL: Denies muscle weakness, joint pain  PSYCHOLOGICAL/BEHAVIORAL: Denies anxiety, severe stress, confusion  SKIN: no rashes, lesions  HEMATOLOGIC: Denies any abnormal bruising or bleeding  ALLERGY/IMMUNOLOGIC: Denies any environmental allergies.     Objective:   BP  "108/62 (BP Location: Right arm, Patient Position: Sitting, BP Method: Large (Manual))   Pulse (!) 44   Resp 16   Ht 5' 6.93" (1.7 m)   Wt 55.7 kg (122 lb 11 oz)   SpO2 99%   BMI 19.26 kg/m²     Physical Exam  GENERAL: Awake, well-developed well-nourished, no apparent distress  HEENT: mucous membranes moist and pink, normocephalic, no cranial or carotid bruits, sclera anicteric  CHEST: Good air movement, clear to auscultation bilaterally  CARDIOVASCULAR: Quiet precordium, regular rhythm, single S1, split S2, normal P2, No S3 or S4, no rub. No clicks or rumbles. No cardiomegaly by palpation. 1/6 vibratory murmur noted at the LLSB.   ABDOMEN: Soft, nontender nondistended, no hepatosplenomegaly, no aortic bruits  EXTREMITIES: Warm well perfused, 2+ brachial/femoral pulses, capillary refill <3 seconds, no clubbing, cyanosis, or edema  NEURO: Alert and oriented, cooperative with exam, face symmetric, moves all extremities well.    Tests:   Today's EKG interpretation by Dr. Yun reveals:   Sinus Rhythm and There is an rSr' pattern in V1 (CRBBB)  (Final report in electronic medical record)    Preliminary report on today's echo:  No significant change.     Echocardiogram:   Pertinent echocardiographic findings from the echo dated 9/27/22 are:   4 Chambers with normally aligned great vessels  Qualitatively normal chamber sizes  No LVH noted  Minimal flattened IVS motion  EF (Teich): 65 %  MV E/A: 2.2  Good LV function  No LVOTO  No RVOTO  Aortic Valve Normal  Pulmonary Valve Normal  Mitral Valve Normal  Tricuspid Valve with myxomatous changes?? & a smaller septal leaflet  Aortic Root Appears Normal  Aortic Arch Appears Normal  Descending Aorta Appears Normal  Desc Ao PG 11 mmHg   No evidence of coarctation of the aorta noted  RCA and LCA ostia are patent by 2D / CF  Normal main and branch pulmonary arteries  No PPS  PVR not imaged well  No shunts noted   LA qualitatively WNL for age   LA volume 22 ml/m2   Moderate TR; " probably due to the smaller septal leaflet( Echo in 2018 had moderate TR also)  Trivial MR  RVSP 27, 29,33 mmHg   IVC and SVC to RA  Clinical Correlation Suggested   Follow-up Warranted   Review with chart & Midlevel  (Full report in electronic medical record)     Echo 8/24/22  Conclusion  Sinus rhythm throughout.  HR Range:  (avg 83) bpm.  No patient-triggered events.  No significant atrial ectopy burden.  Occasional ventricular ectopy  1.8% isolated PVCs  Rare ventricular couplets without triplets      Assessment:  1. S/P VSD closure    2. Status post patch closure of ASD    3. S/P repair of PDA    4. Right bundle branch block    5. Tricuspid valve insufficiency, unspecified etiology        Discussion/Plan:   Soy Davis is a 28 y.o. female with hx of followed after repair of VSD, ASD, and PDA.  She is doing well without complaint.  Her EKG is unchanged with right bundle branch block.  She had an echo today that by preliminary report is not significantly changed.  She has a soft systolic murmur at the lower left sternal border.  She is now 6 weeks postpartum.  We will plan for follow-up in the adult [facility].  I encouraged her to call a few days for the official echo report.  She knows to call us with any concerns.    I have reviewed our general guidelines related to cardiac issues with the family.  I instructed them in the event of an emergency to call 911 or go to the nearest emergency room.  They know to contact the PCP if problems arise or if they are in doubt. The patient should see a dentist every 6 months for routine dental care.    Follow up with the primary care provider for the following issues: Nothing identified.    Activity:She can participate in normal age-appropriate activities. She should be allowed to set her own pace and rest if fatigued.    No endocarditis prophylaxis is recommended in this circumstance.     I spent over 30 minutes with the patient. Over 50% of the time was  spent counseling the patient and family member.    Patient or family member was asked to call the office within 3 days of any testing for results.     Dr. Yun reviewed history and physical exam. He then performed the physical exam. He discussed the findings with the patient's caregiver(s), and answered all questions. I have reviewed our general guidelines related to cardiac issues with the family. I instructed them in the event of an emergency to call 911 or go to the nearest emergency room. They know to contact the PCP if problems arise or if they are in doubt.    Medications:   No current outpatient medications on file.     No current facility-administered medications for this visit.      Orders:   No orders of the defined types were placed in this encounter.    Follow-Up:     To Dr. Mynor Mendez.       Sincerely,  Stephen Yun MD    Note Contributing Authors:  MD Devyn Randolph, MIRTAP-C  This documentation was created using [employer] voice recognition software. Content is subject to voice recognition errors.    04/19/2023    Attestation: Stephen Yun MD    I have reviewed the records and agree with the above.

## 2023-04-21 ENCOUNTER — DOCUMENTATION ONLY (OUTPATIENT)
Dept: PEDIATRIC CARDIOLOGY | Facility: CLINIC | Age: 28
End: 2023-04-21
Payer: COMMERCIAL

## 2025-01-31 ENCOUNTER — PATIENT MESSAGE (OUTPATIENT)
Dept: CARDIOLOGY | Facility: CLINIC | Age: 30
End: 2025-01-31
Payer: COMMERCIAL

## 2025-03-13 NOTE — TELEPHONE ENCOUNTER
Encouraged reading, physical activity and limited screen time.  Limit screen time to 2 hours daily   Stressed importance of school and education.  Promoted good healthy eating choices.  Encouraged increasing responsibilities, chores and gradual independence  Use sunscreen with spf 30 or greater  Use helmet with bike/scooter use  Always carry epipen  Follow-up yearly for a well visit, or sooner as needed.   SW contacted pt, suggested that she see Dr. Delgado and the HD clinic team to do genetic HD testing. Pt reported that she already made plans to come to appt w/ Tess Watson (genetic couselor) for HD test and cannot change these plans.   SW offered a virtual appt with HD clinic team to go over test results, pt agreed to this. SW reached out to Tess Watson to share HD testing protocols (holding results, getting results from Dr. Delgado) and will reach out to pt to plan virtual visit when results are ready.  Pt reports no symptoms of HD. Pt relayed that she would like genetic test for HD for family planning purposes.   SW gave contact info incase pt needs emotional support or has any HD-related questions.

## 2025-04-02 ENCOUNTER — TELEPHONE (OUTPATIENT)
Dept: CARDIOLOGY | Facility: CLINIC | Age: 30
End: 2025-04-02
Payer: COMMERCIAL

## 2025-04-02 DIAGNOSIS — Z87.74 STATUS POST PATCH CLOSURE OF ASD: ICD-10-CM

## 2025-04-02 DIAGNOSIS — Z87.74 S/P VSD CLOSURE: Primary | ICD-10-CM

## 2025-04-02 DIAGNOSIS — Z87.74 S/P REPAIR OF PDA: ICD-10-CM

## 2025-04-02 DIAGNOSIS — I45.10 RIGHT BUNDLE BRANCH BLOCK: ICD-10-CM

## 2025-04-02 NOTE — TELEPHONE ENCOUNTER
Echo, EKG, vitals scheduled in the [place] office for April 23 start time 8 AM. Virtual visit with Dr. Mendez scheduled for May 8 @ 8:30 AM. Patient verbalized understanding all information provided  ----- Message from GURDEEP Xiao sent at 4/2/2025 10:13 AM CDT -----  Contact: pt @  [phone]    ----- Message -----  From: Chadwick Potts  Sent: 4/2/2025   9:26 AM CDT  To: Andrea QUIROZ Staff    Name of Who is Calling: pt  What is the request in detail: calling to schedule appt  Can the clinic reply by [facility]: no  What Number to Call Back if not in [facility]:  [phone]

## 2025-04-23 ENCOUNTER — CLINICAL SUPPORT (OUTPATIENT)
Dept: PEDIATRIC CARDIOLOGY | Facility: CLINIC | Age: 30
End: 2025-04-23
Payer: COMMERCIAL

## 2025-04-23 ENCOUNTER — PATIENT MESSAGE (OUTPATIENT)
Dept: PEDIATRIC CARDIOLOGY | Facility: CLINIC | Age: 30
End: 2025-04-23
Payer: COMMERCIAL

## 2025-04-23 VITALS
HEART RATE: 48 BPM | BODY MASS INDEX: 19.33 KG/M2 | SYSTOLIC BLOOD PRESSURE: 110 MMHG | WEIGHT: 123.13 LBS | DIASTOLIC BLOOD PRESSURE: 60 MMHG | OXYGEN SATURATION: 98 % | HEIGHT: 67 IN | RESPIRATION RATE: 18 BRPM

## 2025-04-23 DIAGNOSIS — Z87.74 S/P REPAIR OF PDA: ICD-10-CM

## 2025-04-23 DIAGNOSIS — I45.10 RIGHT BUNDLE BRANCH BLOCK: ICD-10-CM

## 2025-04-23 DIAGNOSIS — Z87.74 S/P VSD CLOSURE: ICD-10-CM

## 2025-04-23 DIAGNOSIS — Z87.74 STATUS POST PATCH CLOSURE OF ASD: ICD-10-CM

## 2025-04-26 LAB
OHS QRS DURATION: 142 MS
OHS QTC CALCULATION: 414 MS

## 2025-05-08 ENCOUNTER — OFFICE VISIT (OUTPATIENT)
Dept: CARDIOLOGY | Facility: CLINIC | Age: 30
End: 2025-05-08
Payer: COMMERCIAL

## 2025-05-08 DIAGNOSIS — Q24.9 ADULT CONGENITAL HEART DISEASE: Primary | ICD-10-CM

## 2025-05-08 DIAGNOSIS — I45.10 RIGHT BUNDLE BRANCH BLOCK: ICD-10-CM

## 2025-05-08 DIAGNOSIS — Z87.74 S/P REPAIR OF PDA: ICD-10-CM

## 2025-05-08 DIAGNOSIS — Z87.74 STATUS POST PATCH CLOSURE OF ASD: ICD-10-CM

## 2025-05-08 DIAGNOSIS — I07.1 TRICUSPID VALVE INSUFFICIENCY, UNSPECIFIED ETIOLOGY: ICD-10-CM

## 2025-05-08 DIAGNOSIS — Z87.74 S/P VSD CLOSURE: ICD-10-CM

## 2025-05-08 PROBLEM — I95.9 HYPOTENSION: Status: RESOLVED | Noted: 2017-06-27 | Resolved: 2025-05-08

## 2025-05-08 PROBLEM — R93.1 ECHOCARDIOGRAM ABNORMAL: Status: RESOLVED | Noted: 2017-06-27 | Resolved: 2025-05-08

## 2025-05-08 PROBLEM — R94.31 EKG, ABNORMAL: Status: RESOLVED | Noted: 2017-06-27 | Resolved: 2025-05-08

## 2025-05-08 PROBLEM — Z91.89 AT HIGH RISK FOR CARDIAC ARRHYTHMIA: Status: RESOLVED | Noted: 2017-06-27 | Resolved: 2025-05-08

## 2025-05-08 NOTE — PROGRESS NOTES
2025    re:Soy Davis  :1995    Melo Klarissa B., FNP  [address] [facility] [address]    Dr. Steffany Travis  OB    Dr. Terry King [facility]     The patient location is: home in [place], [place]  The chief complaint leading to consultation is: congenital heart disease    Visit type: audiovisual    Face to Face time with patient: 10 min  30 minutes of total time spent on the encounter, which includes face to face time and non-face to face time preparing to see the patient (eg, review of tests), Obtaining and/or reviewing separately obtained history, Documenting clinical information in the electronic or other health record, Independently interpreting results (not separately reported) and communicating results to the patient/family/caregiver, or Care coordination (not separately reported).         Each patient to whom he or she provides medical services by telemedicine is:  (1) informed of the relationship between the physician and patient and the respective role of any other health care provider with respect to management of the patient; and (2) notified that he or she may decline to receive medical services by telemedicine and may withdraw from such care at any time.    Notes:       Dear Doctors:    Soy Davis is a 30 y.o. female seen in my [facility] clinic today for evaluation of congenital heart disease.  To summarize his diagnoses are as follow:  Surgical repair with patch closure of ventricular septal defect, primary closure of atrial septal defect, and double ligation of patent ductus arteriosus by Dr. Cintron at the [facility] May 5, 1997  Normal left ventricular size and function  No residual intracardiac shunting  Mildly abnormal tricuspid valve  Mild-to-moderate tricuspid insufficiency without right atrial or right ventricular enlargement  No pulmonary hypertension or right ventricular dysfunction  Right bundle branch block with  QRS duration around 140 milliseconds  Baseline sinus bradycardia, normal heart rate response to exercise noted on stress test 2018  Tolerated pregnancy in  without any significant difficulty    To summarize, my recommendations are as follows:  Treat as normal from a cardiac standpoint.  There is no need for endocarditis prophylaxis or activity restriction.   Follow-up with echocardiogram, EKG, Holter monitor in 2 years.  If she gets pregnant, I would like to see her about intermediate through the pregnancy.  She would definitely need a fetal echocardiogram.  Patient is low risk for pregnancy.  Vaginal delivery at term is recommended unless there is an obstetric indication for  birth or  section.  I would not expect her to require telemetry or any other special monitoring during her delivery.  Unless there was evidence of cardiac disease in the baby, delivery in [place] is recommended.    Discussion:  She looks great.  She has an excellent repair of her congenital heart disease.  There is no intracardiac shunting.  She has excellent biventricular function.  There is no evidence of pulmonary hypertension.  She does have a right bundle branch block which is typical after her heart surgery.  However, the QRS duration is relatively narrow.  I would expect her risk of ventricular arrhythmias to be very low.  Her tricuspid valve is mildly abnormal, and there is mild-to-moderate insufficiency.  However, there is no right atrial or right ventricular enlargement.  I think it is unlikely that she will require intervention on her tricuspid valve, but we will continue to monitor.  She does run bradycardic, but her heart rate response was normal to exercise and 2018 and she tolerated her pregnancy well 2 years ago.  She may be at some increased risk for sinus node dysfunction as she gets older, but there is certainly no indication for any intervention right now.    I would expect her to tolerate a pregnancy very  well.  There is likely a mild increased risk of arrhythmias, specifically atrial arrhythmias, associated with the volume load with pregnancy.  She should report any palpitations.  There is an increased risk of congenital heart disease in her offspring, so a fetal echocardiogram is definitely indicated if she gets pregnant.    History of present illness:  This is my 1st visit with her.  She is completely asymptomatic from a cardiovascular standpoint without chest pain, palpitations, syncope, near-syncope, cyanosis, or edema.  Her 2-year-old child is doing well.  She tolerated that pregnancy very well without any cardiac complications.  She delivered via vaginal delivery at [facility].    The review of systems is as noted above. It is otherwise negative for other symptoms related to the general, neurological, psychiatric, endocrine, gastrointestinal, genitourinary, respiratory, dermatologic, musculoskeletal, hematologic, and immunologic systems.    Past Medical History:   Diagnosis Date    ASD (atrial septal defect)     s/p suture closure    Complete right bundle branch block     Liveborn infant by vaginal delivery 03/02/2023    PDA (patent ductus arteriosus)     s/p double ligation    Tricuspid regurgitation     Tricuspid valve disorder     smaller septal leaflet    VSD (ventricular septal defect)     s/p patch closure     Past Surgical History:   Procedure Laterality Date    CARDIAC SURGERY  1997    [facility]: VSD- s/p patch closure; ASD- s/p suture closure; PDA- s/p double ligation    DENTAL SURGERY       Family History   Problem Relation Name Age of Onset    No Known Problems Mother      No Known Problems Father      No Known Problems Maternal Grandmother      No Known Problems Maternal Grandfather      No Known Problems Paternal Grandmother      No Known Problems Paternal Grandfather      Cardiomyopathy Neg Hx      Early death Neg Hx      Congenital heart disease Neg Hx      Arrhythmia Neg Hx    "   Heart attacks under age 50 Neg Hx      Hypertension Neg Hx      Long QT syndrome Neg Hx      Pacemaker/defibrilator Neg Hx       Social History     Socioeconomic History    Marital status: Single   Tobacco Use    Smoking status: Never    Smokeless tobacco: Never   Social History Narrative    Works at [employer].      Social Drivers of Health     Financial Resource Strain: Low Risk  (5/8/2025)    Overall Financial Resource Strain (CARDIA)     Difficulty of Paying Living Expenses: Not hard at all   Food Insecurity: No Food Insecurity (5/8/2025)    Hunger Vital Sign     Worried About Running Out of Food in the Last Year: Never true     Ran Out of Food in the Last Year: Never true   Transportation Needs: No Transportation Needs (5/8/2025)    PRAPARE - Transportation     Lack of Transportation (Medical): No     Lack of Transportation (Non-Medical): No   Physical Activity: Insufficiently Active (5/8/2025)    Exercise Vital Sign     Days of Exercise per Week: 1 day     Minutes of Exercise per Session: 20 min   Stress: No Stress Concern Present (5/8/2025)    [place] [employer] - Occupational Stress Questionnaire     Feeling of Stress : Not at all   Housing Stability: Low Risk  (5/8/2025)    Housing Stability Vital Sign     Unable to Pay for Housing in the Last Year: No     Number of Times Moved in the Last Year: 0     Homeless in the Last Year: No     Medications Ordered Prior to Encounter[1]  Review of patient's allergies indicates:  No Known Allergies      4/23/2025   Vitals - 1 value per visit    SYSTOLIC 110    DIASTOLIC 60    Pulse 48 (L)    Resp 18    SPO2 98 %    Weight (lb) 123.13    Weight (kg) 55.85    Height 5' 6.93" (1.7 m)    BMI (Calculated) 19.3       Legend:  (L) Low    In general, she is a very healthy-appearing nondysmorphic female in no apparent distress.      I personally reviewed the following tests performed today and my interpretation follows:  No test today.      EKG March 28, " 2025 with sinus bradycardia, rate 48.  Right bundle branch block with QRS duration 142 milliseconds.    Echocardiogram April 23, 2025:   s/p VSD patch closure, s/p Double ligation PDA, s/p ASD suture closure (05/05/97).  No atrial shunt.  No ventricular shunt.  No patent ductus arteriosus detected.  Normal left ventricle structure and size.  Normal right ventricle structure and size.  Normal left ventricular systolic function.  Normal right ventricular systolic function.  No pericardial effusion.  Mild to moderate tricuspid valve insufficiency.  Right ventricle systolic pressure estimate normal.  Normal pulmonic valve velocity.  No right pulmonary artery stenosis.  No left pulmonary artery stenosis.  No mitral valve insufficiency.  Normal aortic valve velocity.  No aortic valve insufficiency.  No evidence of coarctation of the aorta.    Thank you for referring this patient to our clinic.  Please call with any questions.    Sincerely,        Polo Mendez MD  Pediatric Cardiology  Adult Congenital Heart Disease  Pediatric Heart Failure and Transplantation  [facility] [address] [place]  [address]  [phone]             [1]   No current outpatient medications on file prior to visit.     No current facility-administered medications on file prior to visit.